# Patient Record
Sex: FEMALE | Race: BLACK OR AFRICAN AMERICAN | Employment: FULL TIME | URBAN - METROPOLITAN AREA
[De-identification: names, ages, dates, MRNs, and addresses within clinical notes are randomized per-mention and may not be internally consistent; named-entity substitution may affect disease eponyms.]

---

## 2018-10-11 ENCOUNTER — APPOINTMENT (OUTPATIENT)
Dept: GENERAL RADIOLOGY | Age: 43
End: 2018-10-11
Attending: EMERGENCY MEDICINE
Payer: SELF-PAY

## 2018-10-11 ENCOUNTER — HOSPITAL ENCOUNTER (EMERGENCY)
Age: 43
Discharge: HOME | End: 2018-10-12
Attending: EMERGENCY MEDICINE
Payer: SELF-PAY

## 2018-10-11 DIAGNOSIS — R07.89 ATYPICAL CHEST PAIN: Primary | ICD-10-CM

## 2018-10-11 LAB
BASOPHILS # BLD: 0 K/UL (ref 0–0.1)
BASOPHILS NFR BLD: 0 % (ref 0–2)
D DIMER PPP FEU-MCNC: 0.4 UG/ML(FEU)
DIFFERENTIAL METHOD BLD: NORMAL
EOSINOPHIL # BLD: 0.2 K/UL (ref 0–0.4)
EOSINOPHIL NFR BLD: 2 % (ref 0–5)
ERYTHROCYTE [DISTWIDTH] IN BLOOD BY AUTOMATED COUNT: 12.7 % (ref 11.6–14.5)
HCT VFR BLD AUTO: 41.8 % (ref 35–45)
HGB BLD-MCNC: 13.9 G/DL (ref 12–16)
LYMPHOCYTES # BLD: 3.5 K/UL (ref 0.9–3.6)
LYMPHOCYTES NFR BLD: 33 % (ref 21–52)
MCH RBC QN AUTO: 30.2 PG (ref 24–34)
MCHC RBC AUTO-ENTMCNC: 33.3 G/DL (ref 31–37)
MCV RBC AUTO: 90.7 FL (ref 74–97)
MONOCYTES # BLD: 0.5 K/UL (ref 0.05–1.2)
MONOCYTES NFR BLD: 5 % (ref 3–10)
NEUTS SEG # BLD: 6.5 K/UL (ref 1.8–8)
NEUTS SEG NFR BLD: 60 % (ref 40–73)
PLATELET # BLD AUTO: 255 K/UL (ref 135–420)
PMV BLD AUTO: 10.5 FL (ref 9.2–11.8)
RBC # BLD AUTO: 4.61 M/UL (ref 4.2–5.3)
WBC # BLD AUTO: 10.7 K/UL (ref 4.6–13.2)

## 2018-10-11 PROCEDURE — 93005 ELECTROCARDIOGRAM TRACING: CPT

## 2018-10-11 PROCEDURE — 85025 COMPLETE CBC W/AUTO DIFF WBC: CPT | Performed by: EMERGENCY MEDICINE

## 2018-10-11 PROCEDURE — 74011250637 HC RX REV CODE- 250/637: Performed by: EMERGENCY MEDICINE

## 2018-10-11 PROCEDURE — 99284 EMERGENCY DEPT VISIT MOD MDM: CPT

## 2018-10-11 PROCEDURE — 82553 CREATINE MB FRACTION: CPT | Performed by: EMERGENCY MEDICINE

## 2018-10-11 PROCEDURE — 85379 FIBRIN DEGRADATION QUANT: CPT | Performed by: EMERGENCY MEDICINE

## 2018-10-11 PROCEDURE — 71045 X-RAY EXAM CHEST 1 VIEW: CPT

## 2018-10-11 PROCEDURE — 80048 BASIC METABOLIC PNL TOTAL CA: CPT | Performed by: EMERGENCY MEDICINE

## 2018-10-11 RX ORDER — GUAIFENESIN 100 MG/5ML
324 LIQUID (ML) ORAL
Status: DISCONTINUED | OUTPATIENT
Start: 2018-10-11 | End: 2018-10-11 | Stop reason: CLARIF

## 2018-10-11 RX ORDER — NITROGLYCERIN 0.4 MG/1
0.4 TABLET SUBLINGUAL
Status: COMPLETED | OUTPATIENT
Start: 2018-10-11 | End: 2018-10-11

## 2018-10-11 RX ADMIN — NITROGLYCERIN 0.4 MG: 0.4 TABLET SUBLINGUAL at 23:20

## 2018-10-12 VITALS
TEMPERATURE: 98.5 F | HEART RATE: 64 BPM | RESPIRATION RATE: 18 BRPM | WEIGHT: 171 LBS | OXYGEN SATURATION: 98 % | SYSTOLIC BLOOD PRESSURE: 161 MMHG | DIASTOLIC BLOOD PRESSURE: 89 MMHG

## 2018-10-12 LAB
ANION GAP SERPL CALC-SCNC: 11 MMOL/L (ref 3–18)
ATRIAL RATE: 81 BPM
BUN SERPL-MCNC: 14 MG/DL (ref 7–18)
BUN/CREAT SERPL: 17 (ref 12–20)
CALCIUM SERPL-MCNC: 9.1 MG/DL (ref 8.5–10.1)
CALCULATED P AXIS, ECG09: 58 DEGREES
CALCULATED R AXIS, ECG10: 12 DEGREES
CALCULATED T AXIS, ECG11: 30 DEGREES
CHLORIDE SERPL-SCNC: 103 MMOL/L (ref 100–108)
CK MB CFR SERPL CALC: NORMAL % (ref 0–4)
CK MB SERPL-MCNC: <1 NG/ML (ref 5–25)
CK SERPL-CCNC: 101 U/L (ref 26–192)
CO2 SERPL-SCNC: 28 MMOL/L (ref 21–32)
CREAT SERPL-MCNC: 0.84 MG/DL (ref 0.6–1.3)
DIAGNOSIS, 93000: NORMAL
GLUCOSE SERPL-MCNC: 96 MG/DL (ref 74–99)
P-R INTERVAL, ECG05: 154 MS
POTASSIUM SERPL-SCNC: 3.4 MMOL/L (ref 3.5–5.5)
Q-T INTERVAL, ECG07: 368 MS
QRS DURATION, ECG06: 82 MS
QTC CALCULATION (BEZET), ECG08: 427 MS
SODIUM SERPL-SCNC: 142 MMOL/L (ref 136–145)
TROPONIN I SERPL-MCNC: <0.02 NG/ML (ref 0–0.06)
TROPONIN I SERPL-MCNC: <0.02 NG/ML (ref 0–0.06)
VENTRICULAR RATE, ECG03: 81 BPM

## 2018-10-12 PROCEDURE — 84484 ASSAY OF TROPONIN QUANT: CPT | Performed by: EMERGENCY MEDICINE

## 2018-10-12 RX ORDER — HYDROCHLOROTHIAZIDE 25 MG/1
25 TABLET ORAL DAILY
Qty: 30 TAB | Refills: 0 | Status: SHIPPED | OUTPATIENT
Start: 2018-10-12 | End: 2018-10-22

## 2018-10-12 RX ORDER — IBUPROFEN 600 MG/1
600 TABLET ORAL
Qty: 30 TAB | Refills: 0 | Status: SHIPPED | OUTPATIENT
Start: 2018-10-12 | End: 2019-09-13

## 2018-10-12 NOTE — ED PROVIDER NOTES
EMERGENCY DEPARTMENT HISTORY AND PHYSICAL EXAM 
 
10:56 PM 
 
 
Date: 10/11/2018 Patient Name: Viridiana Greenberg History of Presenting Illness Chief Complaint Patient presents with  Chest Pain History Provided By: Patient Chief Complaint: Chest Pain Duration: 1 Weeks Timing:  Waxing and Waning Location: left side Quality: Donny Duncan Severity: 7 out of 10 Modifying Factors: Nothing makes it better or worse Associated Symptoms: HTN Additional History (Context): Viridiana Greenberg is a 41 y.o. female with a PMHx of HTN who presents with waxing and waning, sharp left sided CP described as a 7/10 at bedside onset x 1 week ago with associated HTN. Nothing makes the CP better or worse. PTA pt took 325 mg Justina aspirin. Pt is currently taking lisinopril, but has not been taking it as prescribed this past week due to running out. Pt denies being under a lot of stress, SOB, fever, and chills. No further concerns or complaints at this time. PCP: UNKNOWN 
 
 
 
Past History Past Medical History: 
Past Medical History:  
Diagnosis Date  Hypertension Past Surgical History: 
History reviewed. No pertinent surgical history. Family History: 
History reviewed. No pertinent family history. Social History: 
Social History Substance Use Topics  Smoking status: Never Smoker  Smokeless tobacco: Never Used  Alcohol use No  
 
 
Allergies: 
No Known Allergies Review of Systems Review of Systems Constitutional: Negative for chills, fatigue and fever. HENT: Negative for sore throat. Eyes: Negative. Respiratory: Negative for cough and shortness of breath. Cardiovascular: Positive for chest pain. Negative for palpitations. Positive for HTN. Gastrointestinal: Negative for abdominal pain, nausea and vomiting. Genitourinary: Negative for dysuria. Musculoskeletal: Negative. Skin: Negative. Neurological: Negative for dizziness, weakness, light-headedness and headaches. Psychiatric/Behavioral: Negative. All other systems reviewed and are negative. Physical Exam  
 
Visit Vitals  BP (!) 165/95  Pulse 71  Temp 98.5 °F (36.9 °C)  Resp 18  Wt 77.6 kg (171 lb)  SpO2 98% Physical Exam  
Constitutional: She is oriented to person, place, and time. She appears well-developed and well-nourished. No distress. HENT:  
Head: Normocephalic and atraumatic. Right Ear: External ear normal.  
Left Ear: External ear normal.  
Nose: Nose normal.  
Mouth/Throat: Oropharynx is clear and moist.  
Eyes: Conjunctivae and EOM are normal. Pupils are equal, round, and reactive to light. No scleral icterus. Neck: Normal range of motion. Neck supple. No JVD present. No tracheal deviation present. No thyromegaly present. Cardiovascular: Normal rate, regular rhythm, normal heart sounds and intact distal pulses. Exam reveals no gallop and no friction rub. No murmur heard. Pulmonary/Chest: Effort normal and breath sounds normal. She exhibits no tenderness. Abdominal: Soft. Bowel sounds are normal. She exhibits no distension. There is no tenderness. There is no rebound and no guarding. Musculoskeletal: Normal range of motion. She exhibits no edema or tenderness. Lymphadenopathy:  
  She has no cervical adenopathy. Neurological: She is alert and oriented to person, place, and time. No cranial nerve deficit. Coordination normal.  
No sensory loss, Gait normal, Motor 5/5 Skin: Skin is warm and dry. Psychiatric: She has a normal mood and affect. Her behavior is normal. Judgment and thought content normal.  
Nursing note and vitals reviewed. Diagnostic Study Results Labs - Recent Results (from the past 12 hour(s)) EKG, 12 LEAD, INITIAL Collection Time: 10/11/18 11:01 PM  
Result Value Ref Range  Ventricular Rate 81 BPM  
 Atrial Rate 81 BPM  
 P-R Interval 154 ms QRS Duration 82 ms Q-T Interval 368 ms QTC Calculation (Bezet) 427 ms Calculated P Axis 58 degrees Calculated R Axis 12 degrees Calculated T Axis 30 degrees Diagnosis Normal sinus rhythm Normal ECG No previous ECGs available CBC WITH AUTOMATED DIFF Collection Time: 10/11/18 11:05 PM  
Result Value Ref Range WBC 10.7 4.6 - 13.2 K/uL  
 RBC 4.61 4.20 - 5.30 M/uL  
 HGB 13.9 12.0 - 16.0 g/dL HCT 41.8 35.0 - 45.0 % MCV 90.7 74.0 - 97.0 FL  
 MCH 30.2 24.0 - 34.0 PG  
 MCHC 33.3 31.0 - 37.0 g/dL  
 RDW 12.7 11.6 - 14.5 % PLATELET 360 423 - 401 K/uL MPV 10.5 9.2 - 11.8 FL  
 NEUTROPHILS 60 40 - 73 % LYMPHOCYTES 33 21 - 52 % MONOCYTES 5 3 - 10 % EOSINOPHILS 2 0 - 5 % BASOPHILS 0 0 - 2 %  
 ABS. NEUTROPHILS 6.5 1.8 - 8.0 K/UL  
 ABS. LYMPHOCYTES 3.5 0.9 - 3.6 K/UL  
 ABS. MONOCYTES 0.5 0.05 - 1.2 K/UL  
 ABS. EOSINOPHILS 0.2 0.0 - 0.4 K/UL  
 ABS. BASOPHILS 0.0 0.0 - 0.1 K/UL  
 DF AUTOMATED METABOLIC PANEL, BASIC Collection Time: 10/11/18 11:05 PM  
Result Value Ref Range Sodium 142 136 - 145 mmol/L Potassium 3.4 (L) 3.5 - 5.5 mmol/L Chloride 103 100 - 108 mmol/L  
 CO2 28 21 - 32 mmol/L Anion gap 11 3.0 - 18 mmol/L Glucose 96 74 - 99 mg/dL BUN 14 7.0 - 18 MG/DL Creatinine 0.84 0.6 - 1.3 MG/DL  
 BUN/Creatinine ratio 17 12 - 20 GFR est AA >60 >60 ml/min/1.73m2 GFR est non-AA >60 >60 ml/min/1.73m2 Calcium 9.1 8.5 - 10.1 MG/DL  
CARDIAC PANEL,(CK, CKMB & TROPONIN) Collection Time: 10/11/18 11:05 PM  
Result Value Ref Range  26 - 192 U/L  
 CK - MB <1.0 <3.6 ng/ml CK-MB Index  0.0 - 4.0 % CALCULATION NOT PERFORMED WHEN RESULT IS BELOW LINEAR LIMIT Troponin-I, Qt. <0.02 0.00 - 0.06 NG/ML  
D DIMER Collection Time: 10/11/18 11:05 PM  
Result Value Ref Range D DIMER 0.40 <0.46 ug/ml(FEU) Radiologic Studies -  
XR CHEST PORT    (Results Pending) Medical Decision Making I am the first provider for this patient. I reviewed the vital signs, available nursing notes, past medical history, past surgical history, family history and social history. Vital Signs-Reviewed the patient's vital signs. Pulse Oximetry Analysis - 100% on room air, normal. 
 
EKG: Interpreted by the EP. Time Interpreted:  
 Rate:  
 Rhythm:  
 Interpretation: 
 Comparison:  
 
Records Reviewed: Nursing Notes (Time of Review: 10:56 PM) 
 
ED Course: Progress Notes, Reevaluation, and Consults: 
Patient remained asymptomatic without chest pain through out her ER evaluation. Patient EKG was normal and 2 sets of cardiac enzymes were normal. 
 
Provider Notes (Medical Decision Making): angina, MI, pleuritis, pneumonia, pneumothorax, PE and etc. 
 
 
Diagnosis Clinical Impression: chest pain Disposition: D/C home, F/U cardiologist in 2 days. Return to ER prn. Follow-up Information Follow up With Details Comments Contact Info [id number] [facility] EMERGENCY DEPT  As needed [address] [id number] [phone] [facility] Call 2-3 days for follow-up or with your own PCP [address] [place] [id number] [phone] Patient's Medications No medications on file  
 
_______________________________ Attestations: 
Scribe Attestation Gavin Strickland acting as a scribe for and in the presence of Jasmina Hall MD     
October 11, 2018 at 10:56 PM 
    
Provider Attestation:     
I personally performed the services described in the documentation, reviewed the documentation, as recorded by the scribe in my presence, and it accurately and completely records my words and actions. October 11, 2018 at 10:56 PM - Jasmina Hall MD   
_______________________________

## 2018-10-12 NOTE — ED NOTES
I have reviewed discharge instructions with the patient and significant other. The patient and significant other verbalized understanding. Current Discharge Medication List  
  
START taking these medications Details  
ibuprofen (MOTRIN) 600 mg tablet Take 1 Tab by mouth every six (6) hours as needed for Pain. Qty: 30 Tab, Refills: 0  
  
hydroCHLOROthiazide (HYDRODIURIL) 25 mg tablet Take 1 Tab by mouth daily for 10 days. Qty: 30 Tab, Refills: 0

## 2018-10-12 NOTE — DISCHARGE INSTRUCTIONS
Chest Pain: Care Instructions  Your Care Instructions    There are many things that can cause chest pain. Some are not serious and will get better on their own in a few days. But some kinds of chest pain need more testing and treatment. Your doctor may have recommended a follow-up visit in the next 8 to 12 hours. If you are not getting better, you may need more tests or treatment. Even though your doctor has released you, you still need to watch for any problems. The doctor carefully checked you, but sometimes problems can develop later. If you have new symptoms or if your symptoms do not get better, get medical care right away. If you have worse or different chest pain or pressure that lasts more than 5 minutes or you passed out (lost consciousness), call 911 or seek other emergency help right away. A medical visit is only one step in your treatment. Even if you feel better, you still need to do what your doctor recommends, such as going to all suggested follow-up appointments and taking medicines exactly as directed. This will help you recover and help prevent future problems. How can you care for yourself at home? · Rest until you feel better. · Take your medicine exactly as prescribed. Call your doctor if you think you are having a problem with your medicine. · Do not drive after taking a prescription pain medicine. When should you call for help? Call 911 if:    · You passed out (lost consciousness).     · You have severe difficulty breathing.     · You have symptoms of a heart attack. These may include:  ¨ Chest pain or pressure, or a strange feeling in your chest.  ¨ Sweating. ¨ Shortness of breath. ¨ Nausea or vomiting. ¨ Pain, pressure, or a strange feeling in your back, neck, jaw, or upper belly or in one or both shoulders or arms. ¨ Lightheadedness or sudden weakness. ¨ A fast or irregular heartbeat.   After you call 911, the  may tell you to chew 1 adult-strength or 2 to 4 low-dose aspirin. Wait for an ambulance. Do not try to drive yourself.    Call your doctor today if:    · You have any trouble breathing.     · Your chest pain gets worse.     · You are dizzy or lightheaded, or you feel like you may faint.     · You are not getting better as expected.     · You are having new or different chest pain. Where can you learn more? Go to http://araceli-maude.info/. Enter A120 in the search box to learn more about \"Chest Pain: Care Instructions. \"  Current as of: November 20, 2017  Content Version: 11.8  © 0089-1025 [employer]. Care instructions adapted under license by [employer] (which disclaims liability or warranty for this information). If you have questions about a medical condition or this instruction, always ask your healthcare professional. [address] any warranty or liability for your use of this information. Chest Pain: Care Instructions  Your Care Instructions    There are many things that can cause chest pain. Some are not serious and will get better on their own in a few days. But some kinds of chest pain need more testing and treatment. Your doctor may have recommended a follow-up visit in the next 8 to 12 hours. If you are not getting better, you may need more tests or treatment. Even though your doctor has released you, you still need to watch for any problems. The doctor carefully checked you, but sometimes problems can develop later. If you have new symptoms or if your symptoms do not get better, get medical care right away. If you have worse or different chest pain or pressure that lasts more than 5 minutes or you passed out (lost consciousness), call 911 or seek other emergency help right away. A medical visit is only one step in your treatment.  Even if you feel better, you still need to do what your doctor recommends, such as going to all suggested follow-up appointments and taking medicines exactly as directed. This will help you recover and help prevent future problems. How can you care for yourself at home? · Rest until you feel better. · Take your medicine exactly as prescribed. Call your doctor if you think you are having a problem with your medicine. · Do not drive after taking a prescription pain medicine. When should you call for help? Call 911 if:    · You passed out (lost consciousness).     · You have severe difficulty breathing.     · You have symptoms of a heart attack. These may include:  ¨ Chest pain or pressure, or a strange feeling in your chest.  ¨ Sweating. ¨ Shortness of breath. ¨ Nausea or vomiting. ¨ Pain, pressure, or a strange feeling in your back, neck, jaw, or upper belly or in one or both shoulders or arms. ¨ Lightheadedness or sudden weakness. ¨ A fast or irregular heartbeat. After you call 911, the  may tell you to chew 1 adult-strength or 2 to 4 low-dose aspirin. Wait for an ambulance. Do not try to drive yourself.    Call your doctor today if:    · You have any trouble breathing.     · Your chest pain gets worse.     · You are dizzy or lightheaded, or you feel like you may faint.     · You are not getting better as expected.     · You are having new or different chest pain. Where can you learn more? Go to http://araceli-maude.info/. Enter A120 in the search box to learn more about \"Chest Pain: Care Instructions. \"  Current as of: November 20, 2017  Content Version: 11.8  © 2821-3754 [employer]. Care instructions adapted under license by [employer] (which disclaims liability or warranty for this information). If you have questions about a medical condition or this instruction, always ask your healthcare professional. [address] any warranty or liability for your use of this information.        Chest Pain: Care Instructions  Your Care Instructions    There are many things that can cause chest pain. Some are not serious and will get better on their own in a few days. But some kinds of chest pain need more testing and treatment. Your doctor may have recommended a follow-up visit in the next 8 to 12 hours. If you are not getting better, you may need more tests or treatment. Even though your doctor has released you, you still need to watch for any problems. The doctor carefully checked you, but sometimes problems can develop later. If you have new symptoms or if your symptoms do not get better, get medical care right away. If you have worse or different chest pain or pressure that lasts more than 5 minutes or you passed out (lost consciousness), call 911 or seek other emergency help right away. A medical visit is only one step in your treatment. Even if you feel better, you still need to do what your doctor recommends, such as going to all suggested follow-up appointments and taking medicines exactly as directed. This will help you recover and help prevent future problems. How can you care for yourself at home? · Rest until you feel better. · Take your medicine exactly as prescribed. Call your doctor if you think you are having a problem with your medicine. · Do not drive after taking a prescription pain medicine. When should you call for help? Call 911 if:    · You passed out (lost consciousness).     · You have severe difficulty breathing.     · You have symptoms of a heart attack. These may include:  ¨ Chest pain or pressure, or a strange feeling in your chest.  ¨ Sweating. ¨ Shortness of breath. ¨ Nausea or vomiting. ¨ Pain, pressure, or a strange feeling in your back, neck, jaw, or upper belly or in one or both shoulders or arms. ¨ Lightheadedness or sudden weakness. ¨ A fast or irregular heartbeat. After you call 911, the  may tell you to chew 1 adult-strength or 2 to 4 low-dose aspirin. Wait for an ambulance.  Do not try to drive yourself.    Call your doctor today if:    · You have any trouble breathing.     · Your chest pain gets worse.     · You are dizzy or lightheaded, or you feel like you may faint.     · You are not getting better as expected.     · You are having new or different chest pain. Where can you learn more? Go to http://araceli-maude.info/. Enter A120 in the search box to learn more about \"Chest Pain: Care Instructions. \"  Current as of: November 20, 2017  Content Version: 11.8  © 6019-2736 [employer]. Care instructions adapted under license by [employer] (which disclaims liability or warranty for this information). If you have questions about a medical condition or this instruction, always ask your healthcare professional. [address] any warranty or liability for your use of this information.

## 2019-09-11 ENCOUNTER — HOSPITAL ENCOUNTER (INPATIENT)
Age: 44
LOS: 2 days | Discharge: HOME HEALTH | DRG: 282 | End: 2019-09-13
Attending: EMERGENCY MEDICINE | Admitting: EMERGENCY MEDICINE
Payer: COMMERCIAL

## 2019-09-11 ENCOUNTER — APPOINTMENT (OUTPATIENT)
Dept: GENERAL RADIOLOGY | Age: 44
DRG: 282 | End: 2019-09-11
Attending: EMERGENCY MEDICINE
Payer: COMMERCIAL

## 2019-09-11 ENCOUNTER — APPOINTMENT (OUTPATIENT)
Dept: NON INVASIVE DIAGNOSTICS | Age: 44
DRG: 282 | End: 2019-09-11
Attending: EMERGENCY MEDICINE
Payer: COMMERCIAL

## 2019-09-11 ENCOUNTER — APPOINTMENT (OUTPATIENT)
Dept: CT IMAGING | Age: 44
DRG: 282 | End: 2019-09-11
Attending: EMERGENCY MEDICINE
Payer: COMMERCIAL

## 2019-09-11 DIAGNOSIS — I24.9 ACS (ACUTE CORONARY SYNDROME) (HCC): ICD-10-CM

## 2019-09-11 DIAGNOSIS — I10 MALIGNANT HYPERTENSION: Primary | ICD-10-CM

## 2019-09-11 DIAGNOSIS — I25.119 CORONARY ARTERY DISEASE WITH ANGINA PECTORIS, UNSPECIFIED VESSEL OR LESION TYPE, UNSPECIFIED WHETHER NATIVE OR TRANSPLANTED HEART (HCC): ICD-10-CM

## 2019-09-11 LAB
ANION GAP SERPL CALC-SCNC: 8 MMOL/L (ref 3–18)
APTT PPP: 28.5 SEC (ref 23–36.4)
BASOPHILS # BLD: 0 K/UL (ref 0–0.1)
BASOPHILS NFR BLD: 0 % (ref 0–2)
BUN SERPL-MCNC: 14 MG/DL (ref 7–18)
BUN/CREAT SERPL: 19 (ref 12–20)
CALCIUM SERPL-MCNC: 8.8 MG/DL (ref 8.5–10.1)
CHLORIDE SERPL-SCNC: 107 MMOL/L (ref 100–111)
CHOLEST SERPL-MCNC: 216 MG/DL
CK MB CFR SERPL CALC: 10.2 % (ref 0–4)
CK MB CFR SERPL CALC: 11.5 % (ref 0–4)
CK MB CFR SERPL CALC: 5.2 % (ref 0–4)
CK MB CFR SERPL CALC: 8.2 % (ref 0–4)
CK MB SERPL-MCNC: 30.7 NG/ML (ref 5–25)
CK MB SERPL-MCNC: 49.5 NG/ML (ref 5–25)
CK MB SERPL-MCNC: 66.3 NG/ML (ref 5–25)
CK MB SERPL-MCNC: 9.5 NG/ML (ref 5–25)
CK SERPL-CCNC: 183 U/L (ref 26–192)
CK SERPL-CCNC: 374 U/L (ref 26–192)
CK SERPL-CCNC: 487 U/L (ref 26–192)
CK SERPL-CCNC: 579 U/L (ref 26–192)
CO2 SERPL-SCNC: 28 MMOL/L (ref 21–32)
CREAT SERPL-MCNC: 0.75 MG/DL (ref 0.6–1.3)
DIFFERENTIAL METHOD BLD: NORMAL
EOSINOPHIL # BLD: 0.2 K/UL (ref 0–0.4)
EOSINOPHIL NFR BLD: 2 % (ref 0–5)
ERYTHROCYTE [DISTWIDTH] IN BLOOD BY AUTOMATED COUNT: 13 % (ref 11.6–14.5)
GLUCOSE SERPL-MCNC: 113 MG/DL (ref 74–99)
HBA1C MFR BLD: 5.6 % (ref 4.2–5.6)
HCG UR QL: NEGATIVE
HCT VFR BLD AUTO: 41.7 % (ref 35–45)
HDLC SERPL-MCNC: 63 MG/DL (ref 40–60)
HDLC SERPL: 3.4 {RATIO} (ref 0–5)
HGB BLD-MCNC: 14 G/DL (ref 12–16)
INR PPP: 0.9 (ref 0.8–1.2)
LDLC SERPL CALC-MCNC: 137.8 MG/DL (ref 0–100)
LIPID PROFILE,FLP: ABNORMAL
LYMPHOCYTES # BLD: 2.3 K/UL (ref 0.9–3.6)
LYMPHOCYTES NFR BLD: 26 % (ref 21–52)
MAGNESIUM SERPL-MCNC: 2 MG/DL (ref 1.6–2.6)
MCH RBC QN AUTO: 29.9 PG (ref 24–34)
MCHC RBC AUTO-ENTMCNC: 33.6 G/DL (ref 31–37)
MCV RBC AUTO: 89.1 FL (ref 74–97)
MONOCYTES # BLD: 0.5 K/UL (ref 0.05–1.2)
MONOCYTES NFR BLD: 6 % (ref 3–10)
NEUTS SEG # BLD: 5.8 K/UL (ref 1.8–8)
NEUTS SEG NFR BLD: 66 % (ref 40–73)
PLATELET # BLD AUTO: 257 K/UL (ref 135–420)
PMV BLD AUTO: 10.2 FL (ref 9.2–11.8)
POTASSIUM SERPL-SCNC: 3.3 MMOL/L (ref 3.5–5.5)
PROTHROMBIN TIME: 12 SEC (ref 11.5–15.2)
RBC # BLD AUTO: 4.68 M/UL (ref 4.2–5.3)
SODIUM SERPL-SCNC: 143 MMOL/L (ref 136–145)
TRIGL SERPL-MCNC: 76 MG/DL (ref ?–150)
TROPONIN I SERPL-MCNC: 12.8 NG/ML (ref 0–0.04)
TROPONIN I SERPL-MCNC: 14.4 NG/ML (ref 0–0.04)
TROPONIN I SERPL-MCNC: 2.74 NG/ML (ref 0–0.04)
TROPONIN I SERPL-MCNC: 7.93 NG/ML (ref 0–0.04)
VLDLC SERPL CALC-MCNC: 15.2 MG/DL
WBC # BLD AUTO: 8.9 K/UL (ref 4.6–13.2)

## 2019-09-11 PROCEDURE — 74011250636 HC RX REV CODE- 250/636: Performed by: EMERGENCY MEDICINE

## 2019-09-11 PROCEDURE — 74011250636 HC RX REV CODE- 250/636: Performed by: INTERNAL MEDICINE

## 2019-09-11 PROCEDURE — 74011250637 HC RX REV CODE- 250/637: Performed by: INTERNAL MEDICINE

## 2019-09-11 PROCEDURE — B2111ZZ FLUOROSCOPY OF MULTIPLE CORONARY ARTERIES USING LOW OSMOLAR CONTRAST: ICD-10-PCS | Performed by: INTERNAL MEDICINE

## 2019-09-11 PROCEDURE — 74011636320 HC RX REV CODE- 636/320: Performed by: INTERNAL MEDICINE

## 2019-09-11 PROCEDURE — 77030013797 HC KT TRNSDUC PRSSR EDWD -A: Performed by: INTERNAL MEDICINE

## 2019-09-11 PROCEDURE — 94761 N-INVAS EAR/PLS OXIMETRY MLT: CPT

## 2019-09-11 PROCEDURE — 77030015766: Performed by: INTERNAL MEDICINE

## 2019-09-11 PROCEDURE — 70450 CT HEAD/BRAIN W/O DYE: CPT

## 2019-09-11 PROCEDURE — B2151ZZ FLUOROSCOPY OF LEFT HEART USING LOW OSMOLAR CONTRAST: ICD-10-PCS | Performed by: INTERNAL MEDICINE

## 2019-09-11 PROCEDURE — 80061 LIPID PANEL: CPT

## 2019-09-11 PROCEDURE — 65660000000 HC RM CCU STEPDOWN

## 2019-09-11 PROCEDURE — 71045 X-RAY EXAM CHEST 1 VIEW: CPT

## 2019-09-11 PROCEDURE — 77030027845 HC BND COM RDL D-STAT TELE -B: Performed by: INTERNAL MEDICINE

## 2019-09-11 PROCEDURE — 74011000250 HC RX REV CODE- 250: Performed by: INTERNAL MEDICINE

## 2019-09-11 PROCEDURE — 81025 URINE PREGNANCY TEST: CPT

## 2019-09-11 PROCEDURE — 74011250637 HC RX REV CODE- 250/637: Performed by: EMERGENCY MEDICINE

## 2019-09-11 PROCEDURE — 83036 HEMOGLOBIN GLYCOSYLATED A1C: CPT

## 2019-09-11 PROCEDURE — 82550 ASSAY OF CK (CPK): CPT

## 2019-09-11 PROCEDURE — 93005 ELECTROCARDIOGRAM TRACING: CPT

## 2019-09-11 PROCEDURE — 74011000250 HC RX REV CODE- 250: Performed by: EMERGENCY MEDICINE

## 2019-09-11 PROCEDURE — 74011250636 HC RX REV CODE- 250/636

## 2019-09-11 PROCEDURE — 96374 THER/PROPH/DIAG INJ IV PUSH: CPT

## 2019-09-11 PROCEDURE — C1894 INTRO/SHEATH, NON-LASER: HCPCS | Performed by: INTERNAL MEDICINE

## 2019-09-11 PROCEDURE — 85730 THROMBOPLASTIN TIME PARTIAL: CPT

## 2019-09-11 PROCEDURE — 93458 L HRT ARTERY/VENTRICLE ANGIO: CPT | Performed by: INTERNAL MEDICINE

## 2019-09-11 PROCEDURE — 83735 ASSAY OF MAGNESIUM: CPT

## 2019-09-11 PROCEDURE — 4A023N7 MEASUREMENT OF CARDIAC SAMPLING AND PRESSURE, LEFT HEART, PERCUTANEOUS APPROACH: ICD-10-PCS | Performed by: INTERNAL MEDICINE

## 2019-09-11 PROCEDURE — 99152 MOD SED SAME PHYS/QHP 5/>YRS: CPT | Performed by: INTERNAL MEDICINE

## 2019-09-11 PROCEDURE — 80048 BASIC METABOLIC PNL TOTAL CA: CPT

## 2019-09-11 PROCEDURE — 85025 COMPLETE CBC W/AUTO DIFF WBC: CPT

## 2019-09-11 PROCEDURE — 85610 PROTHROMBIN TIME: CPT

## 2019-09-11 PROCEDURE — 77030013744: Performed by: INTERNAL MEDICINE

## 2019-09-11 PROCEDURE — 99285 EMERGENCY DEPT VISIT HI MDM: CPT

## 2019-09-11 PROCEDURE — 36415 COLL VENOUS BLD VENIPUNCTURE: CPT

## 2019-09-11 RX ORDER — ONDANSETRON 2 MG/ML
4 INJECTION INTRAMUSCULAR; INTRAVENOUS
Status: DISCONTINUED | OUTPATIENT
Start: 2019-09-11 | End: 2019-09-13 | Stop reason: HOSPADM

## 2019-09-11 RX ORDER — MIDAZOLAM HYDROCHLORIDE 1 MG/ML
INJECTION, SOLUTION INTRAMUSCULAR; INTRAVENOUS AS NEEDED
Status: DISCONTINUED | OUTPATIENT
Start: 2019-09-11 | End: 2019-09-11 | Stop reason: HOSPADM

## 2019-09-11 RX ORDER — DOCUSATE SODIUM 100 MG/1
100 CAPSULE, LIQUID FILLED ORAL 2 TIMES DAILY
Status: DISCONTINUED | OUTPATIENT
Start: 2019-09-11 | End: 2019-09-13 | Stop reason: HOSPADM

## 2019-09-11 RX ORDER — NITROGLYCERIN 0.4 MG/1
0.4 TABLET SUBLINGUAL AS NEEDED
Status: DISCONTINUED | OUTPATIENT
Start: 2019-09-11 | End: 2019-09-13 | Stop reason: HOSPADM

## 2019-09-11 RX ORDER — HYDRALAZINE HYDROCHLORIDE 25 MG/1
25 TABLET, FILM COATED ORAL
Status: DISCONTINUED | OUTPATIENT
Start: 2019-09-11 | End: 2019-09-11

## 2019-09-11 RX ORDER — POTASSIUM CHLORIDE 20 MEQ/1
40 TABLET, EXTENDED RELEASE ORAL DAILY
Status: COMPLETED | OUTPATIENT
Start: 2019-09-12 | End: 2019-09-13

## 2019-09-11 RX ORDER — VERAPAMIL HYDROCHLORIDE 2.5 MG/ML
INJECTION, SOLUTION INTRAVENOUS AS NEEDED
Status: DISCONTINUED | OUTPATIENT
Start: 2019-09-11 | End: 2019-09-11 | Stop reason: HOSPADM

## 2019-09-11 RX ORDER — HYDRALAZINE HYDROCHLORIDE 20 MG/ML
10 INJECTION INTRAMUSCULAR; INTRAVENOUS
Status: DISCONTINUED | OUTPATIENT
Start: 2019-09-11 | End: 2019-09-13 | Stop reason: HOSPADM

## 2019-09-11 RX ORDER — MORPHINE SULFATE 2 MG/ML
2 INJECTION, SOLUTION INTRAMUSCULAR; INTRAVENOUS
Status: DISCONTINUED | OUTPATIENT
Start: 2019-09-11 | End: 2019-09-13 | Stop reason: HOSPADM

## 2019-09-11 RX ORDER — ACETAMINOPHEN 325 MG/1
650 TABLET ORAL
Status: DISCONTINUED | OUTPATIENT
Start: 2019-09-11 | End: 2019-09-13 | Stop reason: HOSPADM

## 2019-09-11 RX ORDER — GUAIFENESIN 100 MG/5ML
81 LIQUID (ML) ORAL DAILY
Status: DISCONTINUED | OUTPATIENT
Start: 2019-09-11 | End: 2019-09-13 | Stop reason: HOSPADM

## 2019-09-11 RX ORDER — METOPROLOL TARTRATE 5 MG/5ML
5 INJECTION INTRAVENOUS
Status: COMPLETED | OUTPATIENT
Start: 2019-09-11 | End: 2019-09-11

## 2019-09-11 RX ORDER — ENOXAPARIN SODIUM 100 MG/ML
80 INJECTION SUBCUTANEOUS
Status: DISCONTINUED | OUTPATIENT
Start: 2019-09-11 | End: 2019-09-11

## 2019-09-11 RX ORDER — CLONIDINE HYDROCHLORIDE 0.1 MG/1
0.1 TABLET ORAL
Status: COMPLETED | OUTPATIENT
Start: 2019-09-11 | End: 2019-09-11

## 2019-09-11 RX ORDER — SODIUM CHLORIDE 0.9 % (FLUSH) 0.9 %
5-40 SYRINGE (ML) INJECTION AS NEEDED
Status: DISCONTINUED | OUTPATIENT
Start: 2019-09-11 | End: 2019-09-13 | Stop reason: HOSPADM

## 2019-09-11 RX ORDER — CARVEDILOL 6.25 MG/1
6.25 TABLET ORAL 2 TIMES DAILY WITH MEALS
Status: DISCONTINUED | OUTPATIENT
Start: 2019-09-11 | End: 2019-09-11

## 2019-09-11 RX ORDER — DIGOXIN 0.25 MG/ML
INJECTION INTRAMUSCULAR; INTRAVENOUS AS NEEDED
Status: DISCONTINUED | OUTPATIENT
Start: 2019-09-11 | End: 2019-09-11 | Stop reason: HOSPADM

## 2019-09-11 RX ORDER — SODIUM CHLORIDE 0.9 % (FLUSH) 0.9 %
5-40 SYRINGE (ML) INJECTION EVERY 8 HOURS
Status: DISCONTINUED | OUTPATIENT
Start: 2019-09-11 | End: 2019-09-13 | Stop reason: HOSPADM

## 2019-09-11 RX ORDER — ONDANSETRON 2 MG/ML
4 INJECTION INTRAMUSCULAR; INTRAVENOUS ONCE
Status: COMPLETED | OUTPATIENT
Start: 2019-09-11 | End: 2019-09-11

## 2019-09-11 RX ORDER — MAGNESIUM SULFATE HEPTAHYDRATE 40 MG/ML
2 INJECTION, SOLUTION INTRAVENOUS ONCE
Status: COMPLETED | OUTPATIENT
Start: 2019-09-11 | End: 2019-09-11

## 2019-09-11 RX ORDER — LIDOCAINE HYDROCHLORIDE 10 MG/ML
INJECTION, SOLUTION EPIDURAL; INFILTRATION; INTRACAUDAL; PERINEURAL AS NEEDED
Status: DISCONTINUED | OUTPATIENT
Start: 2019-09-11 | End: 2019-09-11 | Stop reason: HOSPADM

## 2019-09-11 RX ORDER — DIGOXIN 0.25 MG/ML
INJECTION INTRAMUSCULAR; INTRAVENOUS
Status: DISCONTINUED
Start: 2019-09-11 | End: 2019-09-11 | Stop reason: WASHOUT

## 2019-09-11 RX ORDER — HEPARIN SODIUM 1000 [USP'U]/ML
INJECTION, SOLUTION INTRAVENOUS; SUBCUTANEOUS AS NEEDED
Status: DISCONTINUED | OUTPATIENT
Start: 2019-09-11 | End: 2019-09-11 | Stop reason: HOSPADM

## 2019-09-11 RX ORDER — ATORVASTATIN CALCIUM 40 MG/1
40 TABLET, FILM COATED ORAL
Status: DISCONTINUED | OUTPATIENT
Start: 2019-09-11 | End: 2019-09-13 | Stop reason: HOSPADM

## 2019-09-11 RX ORDER — CARVEDILOL 12.5 MG/1
12.5 TABLET ORAL 2 TIMES DAILY WITH MEALS
Status: DISCONTINUED | OUTPATIENT
Start: 2019-09-11 | End: 2019-09-13 | Stop reason: HOSPADM

## 2019-09-11 RX ORDER — FENTANYL CITRATE 50 UG/ML
INJECTION, SOLUTION INTRAMUSCULAR; INTRAVENOUS AS NEEDED
Status: DISCONTINUED | OUTPATIENT
Start: 2019-09-11 | End: 2019-09-11 | Stop reason: HOSPADM

## 2019-09-11 RX ORDER — AMLODIPINE BESYLATE 2.5 MG/1
2.5 TABLET ORAL DAILY
Status: DISCONTINUED | OUTPATIENT
Start: 2019-09-11 | End: 2019-09-13

## 2019-09-11 RX ORDER — METOPROLOL TARTRATE 5 MG/5ML
INJECTION INTRAVENOUS AS NEEDED
Status: DISCONTINUED | OUTPATIENT
Start: 2019-09-11 | End: 2019-09-11 | Stop reason: HOSPADM

## 2019-09-11 RX ORDER — GUAIFENESIN 100 MG/5ML
162 LIQUID (ML) ORAL
Status: COMPLETED | OUTPATIENT
Start: 2019-09-11 | End: 2019-09-11

## 2019-09-11 RX ORDER — ENOXAPARIN SODIUM 100 MG/ML
1 INJECTION SUBCUTANEOUS
Status: COMPLETED | OUTPATIENT
Start: 2019-09-11 | End: 2019-09-11

## 2019-09-11 RX ORDER — ENOXAPARIN SODIUM 100 MG/ML
1 INJECTION SUBCUTANEOUS EVERY 12 HOURS
Status: DISCONTINUED | OUTPATIENT
Start: 2019-09-11 | End: 2019-09-13 | Stop reason: HOSPADM

## 2019-09-11 RX ADMIN — ENOXAPARIN SODIUM 80 MG: 80 INJECTION SUBCUTANEOUS at 05:03

## 2019-09-11 RX ADMIN — FAMOTIDINE 20 MG: 10 INJECTION INTRAVENOUS at 21:41

## 2019-09-11 RX ADMIN — METOPROLOL TARTRATE 5 MG: 5 INJECTION, SOLUTION INTRAVENOUS at 03:25

## 2019-09-11 RX ADMIN — ASPIRIN 81 MG 81 MG: 81 TABLET ORAL at 09:08

## 2019-09-11 RX ADMIN — ASPIRIN 81 MG 162 MG: 81 TABLET ORAL at 03:21

## 2019-09-11 RX ADMIN — Medication 10 ML: at 14:00

## 2019-09-11 RX ADMIN — DOCUSATE SODIUM 100 MG: 100 CAPSULE, LIQUID FILLED ORAL at 17:28

## 2019-09-11 RX ADMIN — DOCUSATE SODIUM 100 MG: 100 CAPSULE, LIQUID FILLED ORAL at 09:08

## 2019-09-11 RX ADMIN — FAMOTIDINE 20 MG: 10 INJECTION INTRAVENOUS at 09:44

## 2019-09-11 RX ADMIN — ONDANSETRON 4 MG: 2 INJECTION INTRAMUSCULAR; INTRAVENOUS at 09:44

## 2019-09-11 RX ADMIN — CLONIDINE HYDROCHLORIDE 0.1 MG: 0.1 TABLET ORAL at 02:37

## 2019-09-11 RX ADMIN — ONDANSETRON 4 MG: 2 INJECTION INTRAMUSCULAR; INTRAVENOUS at 16:06

## 2019-09-11 RX ADMIN — ACETAMINOPHEN 650 MG: 325 TABLET ORAL at 09:08

## 2019-09-11 RX ADMIN — HYDRALAZINE HYDROCHLORIDE 10 MG: 20 INJECTION INTRAMUSCULAR; INTRAVENOUS at 12:35

## 2019-09-11 RX ADMIN — ATORVASTATIN CALCIUM 40 MG: 40 TABLET, FILM COATED ORAL at 21:40

## 2019-09-11 RX ADMIN — ONDANSETRON 4 MG: 2 INJECTION INTRAMUSCULAR; INTRAVENOUS at 12:35

## 2019-09-11 RX ADMIN — ENOXAPARIN SODIUM 80 MG: 80 INJECTION SUBCUTANEOUS at 18:05

## 2019-09-11 RX ADMIN — NITROGLYCERIN 1 INCH: 20 OINTMENT TOPICAL at 03:22

## 2019-09-11 RX ADMIN — MORPHINE SULFATE 2 MG: 2 INJECTION, SOLUTION INTRAMUSCULAR; INTRAVENOUS at 16:06

## 2019-09-11 RX ADMIN — AMLODIPINE BESYLATE 2.5 MG: 2.5 TABLET ORAL at 09:08

## 2019-09-11 RX ADMIN — CARVEDILOL 12.5 MG: 12.5 TABLET, FILM COATED ORAL at 17:28

## 2019-09-11 RX ADMIN — MORPHINE SULFATE 2 MG: 2 INJECTION, SOLUTION INTRAMUSCULAR; INTRAVENOUS at 12:36

## 2019-09-11 RX ADMIN — Medication 10 ML: at 21:31

## 2019-09-11 RX ADMIN — MAGNESIUM SULFATE 2 G: 2 INJECTION INTRAVENOUS at 14:44

## 2019-09-11 NOTE — ED NOTES
Pt denies any pain at this time, resp relaxed. nsr on monitor noted. Color pink resp relaxed with nasal cannula in place. Pt taken to her car via wheelchair to put personal items in.  Pt has phone and  with her plus her car keys

## 2019-09-11 NOTE — PROGRESS NOTES
[facility] report noted. D/w Dr Cristal Elliott and he recommends to continue treatment dose Lovenox. Will order.

## 2019-09-11 NOTE — ED NOTES
Pt medicated per order. Pt denies any pain at this time. Hx chest pain during the day. resp relaxed.

## 2019-09-11 NOTE — PROGRESS NOTES
Right wrist D-STAT band removed. No bleeding or swelling. Sterile hemostatic dressing applied. Safety splint applied. Safety instructions reviewed. Normal radial pulse, normal distal circulation and neuro check.

## 2019-09-11 NOTE — CONSULTS
[facility] - Consult Note    Consultation request by Dr. Franny Almaraz for advice/opinion related to evaluating NSTEMI    Date of  Admission: 9/11/2019  1:53 AM   Primary Care Physician:  UNKNOWN     Assessment:     -NSTEMI, had episode of chest pain x 2-3 seconds PTA associated with left 4th/5th finger numbness/tingling and diaphoresis, which lasted hours. Troponin 2.74 --> 14.40. Has been nauseous and had V x 4 since being given NTG in the ER.    -HTN, uncontrolled, /120 initially, has been running ~220/140-160 at home. She was previously on HCTZ but not on any medication x 1 year.  -Hyperlipidemia, Chol 216, HDL 63, .8 on 9/11/2019  -S/p hysterectomy approx. 2 years ago    No PCP or cardiologist.     Plan:     Currently seen and evaluated. Agree with below. Patient with non-STEMI, and grandmother who apparently passed away at age 43 with myocardial infarction. She has multiple coronary risk factors. We will proceed with coronary angiography. All questions answered. She is in agreement.    -Cardiac catheterization was discussed with patient and  to include details of procedure, risks, benefits and alternatives. Answered all questions. They are in agreement with procedure this afternoon. Pt states she has not had anything to eat today, advised pt and  to keep NPO for procedure. Cath lab team has been notified.  -Echocardiogram pending. -ASA, Lipitor started by primary team.  -Will add Coreg as well. -Further recommendations to follow based on test results, hospital course. -Importance of medication compliance was emphasized with patient and . History of Present Illness: This is a 45 y.o. female admitted for Malignant hypertension [I10]  Acute coronary syndrome ([facility] [address] [I24.9]  ACS (acute coronary syndrome) ([facility] [address] [I24.9].     Patient complains of:  Chest pain, left hand numbness    Saji Frias is a 45 y.o. female with PMHx as described above, who presented to the hospital due to episode of chest pain that lasted 2-3 seconds associated with numbness/tingling in left 4th/5th fingers and diaphoresis that lasted hours. She reports she developed nausea and has vomited four times since being given NTG. Pt states she has been having intermittent stabbing chest pains from inside out that last for a second over the past several months, along with other chest pains that last a few seconds. States chest pain has been getting more frequent, occurring two times over the past week. She reports chronic chest tightness and shortness of breath that worsen with exertion. She notes that she developed orthopnea approximately 3 weeks ago to where she now uses 5 pillows to help her breathing. Cardiac risk factors: smoking/ tobacco exposure, dyslipidemia, hypertension      Review of Symptoms:  Except as stated above include:  Constitutional:  As per HPI  Respiratory:  negative  Cardiovascular:  As per HPI  Gastrointestinal: As per HPI  Genitourinary:  negative  Musculoskeletal:  Negative  Neurological:  Negative  Dermatological:  Negative  Endocrinological: Negative  Psychological:  Negative       Past Medical History:     Past Medical History:   Diagnosis Date    HLD (hyperlipidemia)     Hypertension          Social History:     Social History     Socioeconomic History    Marital status: SINGLE     Spouse name: Not on file    Number of children: Not on file    Years of education: Not on file    Highest education level: Not on file   Tobacco Use    Smoking status: Never Smoker    Smokeless tobacco: Never Used   Substance and Sexual Activity    Alcohol use: No    Drug use: No        Family History:   History reviewed. No pertinent family history.      Medications:   No Known Allergies     Current Facility-Administered Medications   Medication Dose Route Frequency    aspirin chewable tablet 81 mg  81 mg Oral DAILY    hydrALAZINE (APRESOLINE) tablet 25 mg  25 mg Oral Q6H PRN    amLODIPine (NORVASC) tablet 2.5 mg  2.5 mg Oral DAILY    acetaminophen (TYLENOL) tablet 650 mg  650 mg Oral Q6H PRN    docusate sodium (COLACE) capsule 100 mg  100 mg Oral BID    famotidine (PF) (PEPCID) 20 mg in sodium chloride 0.9% 10 mL injection  20 mg IntraVENous Q12H    atorvastatin (LIPITOR) tablet 40 mg  40 mg Oral QHS    nitroglycerin (NITROSTAT) tablet 0.4 mg  0.4 mg SubLINGual PRN    ondansetron (ZOFRAN) injection 4 mg  4 mg IntraVENous Q6H PRN         Physical Exam:     Visit Vitals  BP (!) 164/97 (BP 1 Location: Right arm, BP Patient Position: At rest)   Pulse 65   Temp 98.4 °F (36.9 °C)   Resp 17   Ht 5' (1.524 m)   Wt 169 lb (76.7 kg)   SpO2 97%   BMI 33.01 kg/m²     BP Readings from Last 3 Encounters:   09/11/19 (!) 164/97   10/12/18 161/89     Pulse Readings from Last 3 Encounters:   09/11/19 65   10/12/18 64     Wt Readings from Last 3 Encounters:   09/11/19 169 lb (76.7 kg)   10/11/18 171 lb (77.6 kg)       General:  alert, cooperative, no distress, appears stated age  Neck:  supple  Lungs:  clear to auscultation bilaterally  Heart:  Regular rate and rhythm  Abdomen:  abdomen is soft without significant tenderness, masses, organomegaly or guarding  Extremities:  Atraumatic, no edema  Skin: Warm and dry.    Neuro: alert, oriented x3, affect appropriate, no focal neurological deficits, moves all extremities well, no involuntary movements  Psych: non focal     Data Review:     Recent Labs     09/11/19  0236   WBC 8.9   HGB 14.0   HCT 41.7        Recent Labs     09/11/19  0236      K 3.3*      CO2 28   *   BUN 14   CREA 0.75   CA 8.8   INR 0.9       Results for orders placed or performed during the hospital encounter of 09/11/19   EKG, 12 LEAD, INITIAL   Result Value Ref Range    Ventricular Rate 70 BPM    Atrial Rate 70 BPM    P-R Interval 162 ms    QRS Duration 92 ms    Q-T Interval 392 ms    QTC Calculation (Bezet) 423 ms    Calculated P Axis 54 degrees    Calculated R Axis 0 degrees    Calculated T Axis 26 degrees    Diagnosis       Normal sinus rhythm  Incomplete right bundle branch block  Minimal voltage criteria for LVH, may be normal variant  Nonspecific T wave abnormality  Abnormal ECG  When compared with ECG of 11-OCT-2018 23:01,  Nonspecific T wave abnormality now evident in Lateral leads         All Cardiac Markers in the last 24 hours:    Lab Results   Component Value Date/Time     (H) 09/11/2019 10:21 AM     09/11/2019 02:36 AM    CKMB 66.3 (H) 09/11/2019 10:21 AM    CKMB 9.5 (H) 09/11/2019 02:36 AM    CKND1 11.5 (H) 09/11/2019 10:21 AM    CKND1 5.2 (H) 09/11/2019 02:36 AM    TROIQ 14.40 () 09/11/2019 10:21 AM    TROIQ 2.74 ([facility]) 09/11/2019 02:36 AM       Last Lipid:    Lab Results   Component Value Date/Time    Cholesterol, total 216 (H) 09/11/2019 10:21 AM    HDL Cholesterol 63 (H) 09/11/2019 10:21 AM    LDL, calculated 137.8 (H) 09/11/2019 10:21 AM    Triglyceride 76 09/11/2019 10:21 AM    CHOL/HDL Ratio 3.4 09/11/2019 10:21 AM       Signed By: Mikhail Tucker PA-C     September 11, 2019

## 2019-09-11 NOTE — PROGRESS NOTES
Reason for Admission:  Malignant hypertension [I10]  Acute coronary syndrome (HCC) [I24.9]  ACS (acute coronary syndrome) ([facility] [address] [I24.9]                 RRAT Score:    0            Plan for utilizing home health:    no                      Likelihood of Readmission:   LOW                         Transition of Care Plan:              Initial assessment completed with patient. Cognitive status of patient: oriented to time, place, person and situation. Face sheet information confirmed:  no. The patient designates Karyle Laws to participate in her discharge plan and to receive any needed information. This patient lives in a single family home with patient and boyfriend. Patient is able to navigate steps as needed. Prior to hospitalization, patient was considered to be independent with ADLs/IADLS : yes . Patient has a current ACP document on file: no  The patient and boyfriend will be available to transport patient home upon discharge. The patient already has none reported,  medical equipment available in the home. Patient is not currently active with home health. Patient has not stayed in a skilled nursing facility or rehab. This patient is on dialysis :no      Freedom of choice signed: no. Currently, the discharge plan is Home. The patient states that she can obtain her medications from the pharmacy, and take her medications as directed.     Patient's current insurance is [employer]       Care Management Interventions  PCP Verified by CM: Yes(Needs PCP)  Mode of Transport at Discharge: Self  Current Support Network: Lives with Spouse(Fiance)  Confirm Follow Up Transport: Family  Plan discussed with Pt/Family/Caregiver: Yes  Discharge Location  Discharge Placement: Jorge Woodall, RN  Care Manager  [phone]

## 2019-09-11 NOTE — Clinical Note
TRANSFER - IN REPORT:  
 
Verbal report received from: [address]. Report consisted of patient's Situation, Background, Assessment and  
Recommendations(SBAR). Opportunity for questions and clarification was provided. Assessment completed upon patient's arrival to unit and care assumed. Patient transported with a Cardiac Cath Tech / Patient Care Tech.

## 2019-09-11 NOTE — ROUTINE PROCESS
Bedside shift change report given to [facility] (oncoming nurse) by Micheal Manley RN (offgoing nurse). Report included the following information SBAR, Kardex, Intake/Output, MAR and Recent Results.

## 2019-09-11 NOTE — PROGRESS NOTES
Received report from 2S RN. Written report given to Julia Romero. Pt to be direct loaded into cath lab.

## 2019-09-11 NOTE — Clinical Note
PT RECENT HX OF MALIGNANT HTN  
PT RECEIVED HYDRALAZINE, MORPHINE,ZOPHRAN, NTG 
IV LFT WRIST 
RECENT EPISODES OF NAUSEA AND VOMITING

## 2019-09-11 NOTE — ED NOTES
Pt has been out of blood pressure medicine r/t no insuance. Pt just started with new insurance September 1st. Education on getting primary MD done with pt. Pt c/o headache, and elevated bp at home. Pt medicated per order.

## 2019-09-11 NOTE — ED PROVIDER NOTES
Pt c/o freq chest pain and ha. Sx's of both just pta. Took motrin, pain resolved no curr pain. Lasts 20-30 min when present. Says also freq left arm numbness last few days. Says numb just pta. No numbness currently. No weakness. No dizziness. H/o htn, says lost pcp 8 months ago, no meds since then. No current pcp. Was on hctz. No urinary sx's. Not pregnant. No leg pain or swelling. No vision changes. No current complaints. Says diastolic blood pressure was 140 pta. Past Medical History:   Diagnosis Date    HLD (hyperlipidemia)     Hypertension        Past Surgical History:   Procedure Laterality Date    HX HYSTERECTOMY           History reviewed. No pertinent family history.     Social History     Socioeconomic History    Marital status: SINGLE     Spouse name: Not on file    Number of children: Not on file    Years of education: Not on file    Highest education level: Not on file   Occupational History    Not on file   Social Needs    Financial resource strain: Not on file    Food insecurity:     Worry: Not on file     Inability: Not on file    Transportation needs:     Medical: Not on file     Non-medical: Not on file   Tobacco Use    Smoking status: Never Smoker    Smokeless tobacco: Never Used   Substance and Sexual Activity    Alcohol use: No    Drug use: No    Sexual activity: Not on file   Lifestyle    Physical activity:     Days per week: Not on file     Minutes per session: Not on file    Stress: Not on file   Relationships    Social connections:     Talks on phone: Not on file     Gets together: Not on file     Attends Confucianist service: Not on file     Active member of club or organization: Not on file     Attends meetings of clubs or organizations: Not on file     Relationship status: Not on file    Intimate partner violence:     Fear of current or ex partner: Not on file     Emotionally abused: Not on file     Physically abused: Not on file     Forced sexual activity: Not on file   Other Topics Concern    Not on file   Social History Narrative    Not on file         ALLERGIES: Patient has no known allergies. Review of Systems   Constitutional: Negative for fever. HENT: Negative for congestion. Respiratory: Negative for cough and shortness of breath. Cardiovascular: Positive for chest pain. Gastrointestinal: Negative for abdominal pain, blood in stool and vomiting. Musculoskeletal: Negative for back pain. Skin: Negative for rash. Neurological: Positive for numbness and headaches. Negative for light-headedness. All other systems reviewed and are negative. Vitals:    09/11/19 0310 09/11/19 0328 09/11/19 0353 09/11/19 0354   BP: (!) 192/113 (!) 175/95 (!) 159/103    Pulse:  67  68   Resp:  17  16   Temp:       SpO2: 100% 100%  100%   Weight:       Height:                Physical Exam   Constitutional: She is oriented to person, place, and time. She appears well-developed. HENT:   Head: Normocephalic and atraumatic. Eyes: Pupils are equal, round, and reactive to light. Neck: Normal range of motion. Cardiovascular: Normal rate and regular rhythm. No murmur heard. Pulmonary/Chest: Effort normal. She has no wheezes. Abdominal: Soft. There is no tenderness. Musculoskeletal: She exhibits no tenderness. Neurological: She is alert and oriented to person, place, and time. Skin: Skin is dry. Capillary refill takes less than 2 seconds. No rash noted. She is not diaphoretic. Psychiatric: She has a normal mood and affect. Nursing note and vitals reviewed.        MDM       Procedures    Vitals:  Patient Vitals for the past 12 hrs:   Temp Pulse Resp BP SpO2   09/11/19 0354  68 16  100 %   09/11/19 0353    (!) 159/103    09/11/19 0328  67 17 (!) 175/95 100 %   09/11/19 0310    (!) 192/113 100 %   09/11/19 0240    (!) 181/112 98 %   09/11/19 0149 98.8 °F (37.1 °C) 72 18 (!) 210/120 99 %         Medications ordered:   Medications enoxaparin (LOVENOX) injection 80 mg (has no administration in time range)   cloNIDine HCl (CATAPRES) tablet 0.1 mg (0.1 mg Oral Given 9/11/19 0237)   nitroglycerin (NITROBID) 2 % ointment 1 Inch (1 Inch Topical Given 9/11/19 0322)   aspirin chewable tablet 162 mg (162 mg Oral Given 9/11/19 0321)   metoprolol (LOPRESSOR) injection 5 mg (5 mg IntraVENous Given 9/11/19 0325)         Lab findings:  Recent Results (from the past 12 hour(s))   EKG, 12 LEAD, INITIAL    Collection Time: 09/11/19  2:31 AM   Result Value Ref Range    Ventricular Rate 70 BPM    Atrial Rate 70 BPM    P-R Interval 162 ms    QRS Duration 92 ms    Q-T Interval 392 ms    QTC Calculation (Bezet) 423 ms    Calculated P Axis 54 degrees    Calculated R Axis 0 degrees    Calculated T Axis 26 degrees    Diagnosis       Normal sinus rhythm  Incomplete right bundle branch block  Minimal voltage criteria for LVH, may be normal variant  Nonspecific T wave abnormality  Abnormal ECG  When compared with ECG of 11-OCT-2018 23:01,  Nonspecific T wave abnormality now evident in Lateral leads     CBC WITH AUTOMATED DIFF    Collection Time: 09/11/19  2:36 AM   Result Value Ref Range    WBC 8.9 4.6 - 13.2 K/uL    RBC 4.68 4.20 - 5.30 M/uL    HGB 14.0 12.0 - 16.0 g/dL    HCT 41.7 35.0 - 45.0 %    MCV 89.1 74.0 - 97.0 FL    MCH 29.9 24.0 - 34.0 PG    MCHC 33.6 31.0 - 37.0 g/dL    RDW 13.0 11.6 - 14.5 %    PLATELET 795 454 - 653 K/uL    MPV 10.2 9.2 - 11.8 FL    NEUTROPHILS 66 40 - 73 %    LYMPHOCYTES 26 21 - 52 %    MONOCYTES 6 3 - 10 %    EOSINOPHILS 2 0 - 5 %    BASOPHILS 0 0 - 2 %    ABS. NEUTROPHILS 5.8 1.8 - 8.0 K/UL    ABS. LYMPHOCYTES 2.3 0.9 - 3.6 K/UL    ABS. MONOCYTES 0.5 0.05 - 1.2 K/UL    ABS. EOSINOPHILS 0.2 0.0 - 0.4 K/UL    ABS.  BASOPHILS 0.0 0.0 - 0.1 K/UL    DF AUTOMATED     METABOLIC PANEL, BASIC    Collection Time: 09/11/19  2:36 AM   Result Value Ref Range    Sodium 143 136 - 145 mmol/L    Potassium 3.3 (L) 3.5 - 5.5 mmol/L    Chloride 107 100 - 111 mmol/L    CO2 28 21 - 32 mmol/L    Anion gap 8 3.0 - 18 mmol/L    Glucose 113 (H) 74 - 99 mg/dL    BUN 14 7.0 - 18 MG/DL    Creatinine 0.75 0.6 - 1.3 MG/DL    BUN/Creatinine ratio 19 12 - 20      GFR est AA >60 >60 ml/min/1.73m2    GFR est non-AA >60 >60 ml/min/1.73m2    Calcium 8.8 8.5 - 10.1 MG/DL   CARDIAC PANEL,(CK, CKMB & TROPONIN)    Collection Time: 09/11/19  2:36 AM   Result Value Ref Range     26 - 192 U/L    CK - MB 9.5 (H) <3.6 ng/ml    CK-MB Index 5.2 (H) 0.0 - 4.0 %    Troponin-I, QT 2.74 (HH) 0.0 - 0.045 NG/ML   PROTHROMBIN TIME + INR    Collection Time: 09/11/19  2:36 AM   Result Value Ref Range    Prothrombin time 12.0 11.5 - 15.2 sec    INR 0.9 0.8 - 1.2     PTT    Collection Time: 09/11/19  2:36 AM   Result Value Ref Range    aPTT 28.5 23.0 - 36.4 SEC           X-Ray, CT or other radiology findings or impressions:  CT HEAD WO CONT   Final Result   IMPRESSION:       Unremarkable CT appearance of the brain. Empty sella. XR CHEST PORT    (Results Pending)       Progress notes, Consult notes or additional Procedure notes:   3:29 AM no curr complaints. Nvi. No cp. bp improving  4:03 AM bp 156/89 feels much better, no complaints. 4:11 AM d/w dr Giselle Soares, to admit      Diagnosis:   1. Malignant hypertension    2. ACS (acute coronary syndrome) ([facility])        Disposition: home    Follow-up Information    None          Patient's Medications   Start Taking    No medications on file   Continue Taking    IBUPROFEN (MOTRIN) 600 MG TABLET    Take 1 Tab by mouth every six (6) hours as needed for Pain.    These Medications have changed    No medications on file   Stop Taking    No medications on file

## 2019-09-11 NOTE — Clinical Note
Right groin and right radial clipped, prepped with ChloraPrep and draped. Wet prep solution applied at: 116. Wet prep solution dried at: 119. Wet prep elapsed drying time: 3 mins.

## 2019-09-11 NOTE — PROGRESS NOTES
Ms. Gaines Self requested morphine for a headache. She last received morphine, IV @ 1236. I declined to give her morphine and offered her Tylenol, she declined the Tylenol.

## 2019-09-11 NOTE — Clinical Note
Contrast Dose Calculator:  
Patient's age: 45.  
Patient's sex: Female. Patient weight (kg) = 76.7. Creatinine level (mg/dL) = 0.75. Creatinine clearance (mL/min): 118. Contrast concentration (mg/mL) = 300. MACD = 300 mL. Max Contrast dose per Creatinine Cl calculator = 265.5 mL.

## 2019-09-11 NOTE — H&P
[facility]  HISTORY AND PHYSICAL    Name:  Mariano Trejo  MR#:   [id number]  :  1975  ACCOUNT #:  [de-identified]  ADMIT DATE:  2019      CHIEF COMPLAINT:  Chest pain and headache. HISTORY OF PRESENT ILLNESS:  This is a 57-year-old female presented to [facility] Emergency Room with complaints of chest pain and headache. The patient was evaluated at [facility] and then admission was requested, [facility] When I saw the patient, she currently denies any chest pain. The patient states that the chest pain she had was left sided, episodic, and lasted for around 20 to 30 minutes. The patient has also noted some episodes of left arm numbness. Currently, the patient denies any numbness. The patient denies any neck pain. No history of any fever or chills. The patient describes headache. The patient states her headache got worse after she received some nitroglycerin paste at [facility] ED. The patient says that she also developed some nausea and vomiting since she received the nitroglycerin paste. No history of any cough. No history of any shortness of breath. No history of any palpitations. No history of abdominal pain, urinary complaints, diarrhea, or leg swelling. No history of any rash. No history of any rectal bleeding. The patient states that she has a known history of high blood pressure but did not have insurance and has not been taking any blood pressure medication for more than a year. PAST MEDICAL HISTORY:  Hypertension and dyslipidemia. PAST SURGICAL HISTORY:  The patient has had a hysterectomy in the past.    ALLERGIES:  NO KNOWN DRUG ALLERGIES. MEDICATIONS PRIOR TO ADMISSION:  None as per the patient, the patient denies taking any herbal products at home. SOCIAL HISTORY:  The patient denies any tobacco use, any alcohol use. The patient denies any illicit drug use.   The patient works as a program implementer. FAMILY HISTORY:  Mother  from heart attack at 43years of age. Father  from alcoholism. PHYSICAL EXAMINATION:  GENERAL:  This is a 65-year-old female sitting in bed in no apparent distress. VITAL SIGNS:  For the patient show a temperature of 98.4, pulse rate of 65, blood pressure now 164/97, respiratory rate of 17, oxygen saturation of 97%. Initial blood pressure was 210/120. HEENT:  Head:  Normocephalic, atraumatic. Eyes:  Pupils are reactive to light bilaterally. Ears, Nose, and Throat:  No ear or nasal discharge is seen. Mucous membranes are moist.  NECK:  Supple. No JVD. No carotid bruit. No thyromegaly. No lymphadenopathy. LUNGS:  Clear to auscultation bilaterally. HEART:  S1 and S2 are heard. Regular rate and rhythm. ABDOMEN:  Soft. Bowel sounds positive. Nontender. Nondistended. EXTREMITIES:  No lower extremity edema is noted. Pulses are 1+, bilaterally equal.  NEUROLOGIC:  The patient is awake, alert, and oriented x3. The patient follows commands and responds appropriately. No focal neurological deficit is identified. LABORATORY DATA:  Labs today show a WBC count of 8.9, hemoglobin of 14.0, hematocrit 41.7, platelet count 976, INR of 0.9. Sodium 143, potassium 3.3, chloride 107, CO2 28, glucose of 113, BUN 14, creatinine 0.75. Troponin first set is 2.73. CT scan of the head was done in the ED which was reported as unremarkable CT appearance of the brain and it mentioned empty sella. EKG as per my reading showed sinus rhythm at the rate of 70 beats per minute, nonspecific ST-T changes are noted. EKG was read by myself. IMPRESSION:  1. Hypertensive urgency. 2.  Elevated troponin with concern for non-ST elevation myocardial infarction. 3.  History of noncompliance. 4.  History of dyslipidemia. 5.  Empty sella noted on CT scan. 6.  Prior history of hysterectomy. PLAN:  The patient has been admitted to telemetry.   Cardiac biomarkers will be trended. The patient will be continued on aspirin, and the patient did receive treatment dose of Lovenox around 5 a.m. this morning while at [facility] ED. The patient also received a dose of beta-blocker. Blood pressure has shown improvement with the medications that the patient received at [facility]. At this time, we will start the patient on amlodipine. We will also start the patient on statin. We will order p.r.n. hydralazine. Cardiology has been consulted. Echocardiogram has been ordered. Given that the patient had worsening headache, nausea, and vomiting after the nitroglycerin paste, I have asked the nursing staff to remove that, and we will continue to monitor blood pressures. Rest depending on the patient's further hospital course. Plan of care was discussed with the patient, and she verbalized understanding and agreed. The patient states that she recently moved to this area from [place] but plans to stay here. I also discussed the level of care with the patient and she wishes to be a full code.       Demetris Oliveira MD      [id number]  D:  09/11/2019 11:53  T:  09/11/2019 14:19  JOB #:  [id number]

## 2019-09-11 NOTE — ROUTINE PROCESS
MD notified of patient arrival. Currently awaiting orders. Patient in room resting quietly, vitals completed.

## 2019-09-11 NOTE — ED NOTES
TRANSFER - OUT REPORT:    Verbal report given to [employer] (name) on Saji Borne  being transferred to 217(unit) for routine progression of care       Report consisted of patients Situation, Background, Assessment and   Recommendations(SBAR). Information from the following report(s) SBAR, ED Summary, MAR, Recent Results and Cardiac Rhythm nsr was reviewed with the receiving nurse. Opportunity for questions and clarification was provided.       Patient transported with:   Monitor  O2 @ 2 liters

## 2019-09-11 NOTE — Clinical Note
TRANSFER - OUT REPORT:  
 
Verbal report given to: [address] Report consisted of patient's Situation, Background, Assessment and  
Recommendations(SBAR). Opportunity for questions and clarification was provided. Patient transported with a Cardiac Cath Tech / Patient Care Tech. Patient transported to: [address]

## 2019-09-11 NOTE — ED TRIAGE NOTES
Pt c/o elevated blood pressure & headache for several days. Pt states she has been out of bp medication for approx. 8 months.

## 2019-09-12 ENCOUNTER — APPOINTMENT (OUTPATIENT)
Dept: NON INVASIVE DIAGNOSTICS | Age: 44
DRG: 282 | End: 2019-09-12
Attending: EMERGENCY MEDICINE
Payer: COMMERCIAL

## 2019-09-12 LAB
ANION GAP SERPL CALC-SCNC: 8 MMOL/L (ref 3–18)
ATRIAL RATE: 105 BPM
ATRIAL RATE: 66 BPM
ATRIAL RATE: 70 BPM
BASOPHILS # BLD: 0 K/UL (ref 0–0.1)
BASOPHILS NFR BLD: 0 % (ref 0–2)
BUN SERPL-MCNC: 12 MG/DL (ref 7–18)
BUN/CREAT SERPL: 15 (ref 12–20)
CALCIUM SERPL-MCNC: 8.1 MG/DL (ref 8.5–10.1)
CALCULATED P AXIS, ECG09: 54 DEGREES
CALCULATED P AXIS, ECG09: 57 DEGREES
CALCULATED R AXIS, ECG10: 0 DEGREES
CALCULATED R AXIS, ECG10: 28 DEGREES
CALCULATED T AXIS, ECG11: -116 DEGREES
CALCULATED T AXIS, ECG11: -60 DEGREES
CALCULATED T AXIS, ECG11: 26 DEGREES
CHLORIDE SERPL-SCNC: 102 MMOL/L (ref 100–111)
CO2 SERPL-SCNC: 27 MMOL/L (ref 21–32)
CREAT SERPL-MCNC: 0.79 MG/DL (ref 0.6–1.3)
DIAGNOSIS, 93000: NORMAL
DIFFERENTIAL METHOD BLD: NORMAL
ECHO AO ROOT DIAM: 2.53 CM
ECHO LA AREA 4C: 19.7 CM2
ECHO LA VOL 2C: 60.31 ML (ref 22–52)
ECHO LA VOL 4C: 58.17 ML (ref 22–52)
ECHO LA VOL BP: 62.64 ML (ref 22–52)
ECHO LA VOL/BSA BIPLANE: 36.05 ML/M2 (ref 16–28)
ECHO LA VOLUME INDEX A2C: 34.71 ML/M2 (ref 16–28)
ECHO LA VOLUME INDEX A4C: 33.48 ML/M2 (ref 16–28)
ECHO LV INTERNAL DIMENSION DIASTOLIC: 4.2 CM (ref 3.9–5.3)
ECHO LV INTERNAL DIMENSION SYSTOLIC: 2.46 CM
ECHO LV IVSD: 1.36 CM (ref 0.6–0.9)
ECHO LV MASS 2D: 227.1 G (ref 67–162)
ECHO LV MASS INDEX 2D: 130.7 G/M2 (ref 43–95)
ECHO LV POSTERIOR WALL DIASTOLIC: 1.17 CM (ref 0.6–0.9)
ECHO LVOT DIAM: 1.76 CM
ECHO LVOT PEAK GRADIENT: 2.8 MMHG
ECHO LVOT PEAK VELOCITY: 82.97 CM/S
ECHO LVOT VTI: 18.06 CM
ECHO MV A VELOCITY: 78.58 CM/S
ECHO MV E DECELERATION TIME (DT): 194.1 MS
ECHO MV E VELOCITY: 65.75 CM/S
ECHO MV E/A RATIO: 0.84
ECHO TV REGURGITANT MAX VELOCITY: 233.45 CM/S
ECHO TV REGURGITANT PEAK GRADIENT: 21.8 MMHG
EOSINOPHIL # BLD: 0.1 K/UL (ref 0–0.4)
EOSINOPHIL NFR BLD: 1 % (ref 0–5)
ERYTHROCYTE [DISTWIDTH] IN BLOOD BY AUTOMATED COUNT: 13.1 % (ref 11.6–14.5)
GLUCOSE SERPL-MCNC: 84 MG/DL (ref 74–99)
HCT VFR BLD AUTO: 38.7 % (ref 35–45)
HGB BLD-MCNC: 13.2 G/DL (ref 12–16)
LYMPHOCYTES # BLD: 2.4 K/UL (ref 0.9–3.6)
LYMPHOCYTES NFR BLD: 23 % (ref 21–52)
MAGNESIUM SERPL-MCNC: 2.5 MG/DL (ref 1.6–2.6)
MCH RBC QN AUTO: 29.6 PG (ref 24–34)
MCHC RBC AUTO-ENTMCNC: 34.1 G/DL (ref 31–37)
MCV RBC AUTO: 86.8 FL (ref 74–97)
MONOCYTES # BLD: 0.9 K/UL (ref 0.05–1.2)
MONOCYTES NFR BLD: 8 % (ref 3–10)
NEUTS SEG # BLD: 7.2 K/UL (ref 1.8–8)
NEUTS SEG NFR BLD: 68 % (ref 40–73)
P-R INTERVAL, ECG05: 162 MS
P-R INTERVAL, ECG05: 170 MS
PLATELET # BLD AUTO: 267 K/UL (ref 135–420)
PMV BLD AUTO: 10.4 FL (ref 9.2–11.8)
POTASSIUM SERPL-SCNC: 2.9 MMOL/L (ref 3.5–5.5)
Q-T INTERVAL, ECG07: 320 MS
Q-T INTERVAL, ECG07: 392 MS
Q-T INTERVAL, ECG07: 460 MS
QRS DURATION, ECG06: 80 MS
QRS DURATION, ECG06: 84 MS
QRS DURATION, ECG06: 92 MS
QTC CALCULATION (BEZET), ECG08: 423 MS
QTC CALCULATION (BEZET), ECG08: 481 MS
QTC CALCULATION (BEZET), ECG08: 482 MS
RBC # BLD AUTO: 4.46 M/UL (ref 4.2–5.3)
SODIUM SERPL-SCNC: 137 MMOL/L (ref 136–145)
VENTRICULAR RATE, ECG03: 136 BPM
VENTRICULAR RATE, ECG03: 66 BPM
VENTRICULAR RATE, ECG03: 70 BPM
WBC # BLD AUTO: 10.6 K/UL (ref 4.6–13.2)

## 2019-09-12 PROCEDURE — 74011000250 HC RX REV CODE- 250: Performed by: EMERGENCY MEDICINE

## 2019-09-12 PROCEDURE — 93005 ELECTROCARDIOGRAM TRACING: CPT

## 2019-09-12 PROCEDURE — 93306 TTE W/DOPPLER COMPLETE: CPT

## 2019-09-12 PROCEDURE — 83735 ASSAY OF MAGNESIUM: CPT

## 2019-09-12 PROCEDURE — 74011000250 HC RX REV CODE- 250: Performed by: HOSPITALIST

## 2019-09-12 PROCEDURE — 74011250636 HC RX REV CODE- 250/636: Performed by: EMERGENCY MEDICINE

## 2019-09-12 PROCEDURE — 65660000000 HC RM CCU STEPDOWN

## 2019-09-12 PROCEDURE — 74011000258 HC RX REV CODE- 258: Performed by: HOSPITALIST

## 2019-09-12 PROCEDURE — 74011250637 HC RX REV CODE- 250/637: Performed by: INTERNAL MEDICINE

## 2019-09-12 PROCEDURE — 94762 N-INVAS EAR/PLS OXIMTRY CONT: CPT

## 2019-09-12 PROCEDURE — 80048 BASIC METABOLIC PNL TOTAL CA: CPT

## 2019-09-12 PROCEDURE — 36415 COLL VENOUS BLD VENIPUNCTURE: CPT

## 2019-09-12 PROCEDURE — 74011250637 HC RX REV CODE- 250/637: Performed by: EMERGENCY MEDICINE

## 2019-09-12 PROCEDURE — 74011250636 HC RX REV CODE- 250/636: Performed by: HOSPITALIST

## 2019-09-12 PROCEDURE — 85025 COMPLETE CBC W/AUTO DIFF WBC: CPT

## 2019-09-12 RX ADMIN — DOCUSATE SODIUM 100 MG: 100 CAPSULE, LIQUID FILLED ORAL at 08:14

## 2019-09-12 RX ADMIN — ENOXAPARIN SODIUM 80 MG: 80 INJECTION SUBCUTANEOUS at 06:21

## 2019-09-12 RX ADMIN — POTASSIUM CHLORIDE 40 MEQ: 20 TABLET, EXTENDED RELEASE ORAL at 08:14

## 2019-09-12 RX ADMIN — DOCUSATE SODIUM 100 MG: 100 CAPSULE, LIQUID FILLED ORAL at 17:30

## 2019-09-12 RX ADMIN — POTASSIUM CHLORIDE: 2 INJECTION, SOLUTION, CONCENTRATE INTRAVENOUS at 09:00

## 2019-09-12 RX ADMIN — ASPIRIN 81 MG 81 MG: 81 TABLET ORAL at 08:14

## 2019-09-12 RX ADMIN — ENOXAPARIN SODIUM 80 MG: 80 INJECTION SUBCUTANEOUS at 17:30

## 2019-09-12 RX ADMIN — ATORVASTATIN CALCIUM 40 MG: 40 TABLET, FILM COATED ORAL at 21:38

## 2019-09-12 RX ADMIN — FAMOTIDINE 20 MG: 10 INJECTION INTRAVENOUS at 21:38

## 2019-09-12 RX ADMIN — AMLODIPINE BESYLATE 2.5 MG: 2.5 TABLET ORAL at 08:14

## 2019-09-12 RX ADMIN — Medication 10 ML: at 21:38

## 2019-09-12 RX ADMIN — CARVEDILOL 12.5 MG: 12.5 TABLET, FILM COATED ORAL at 08:14

## 2019-09-12 RX ADMIN — FAMOTIDINE 20 MG: 10 INJECTION INTRAVENOUS at 08:15

## 2019-09-12 RX ADMIN — POTASSIUM CHLORIDE: 2 INJECTION, SOLUTION, CONCENTRATE INTRAVENOUS at 08:17

## 2019-09-12 RX ADMIN — CARVEDILOL 12.5 MG: 12.5 TABLET, FILM COATED ORAL at 17:30

## 2019-09-12 RX ADMIN — POTASSIUM CHLORIDE: 2 INJECTION, SOLUTION, CONCENTRATE INTRAVENOUS at 06:21

## 2019-09-12 RX ADMIN — Medication 10 ML: at 06:27

## 2019-09-12 RX ADMIN — POTASSIUM CHLORIDE: 2 INJECTION, SOLUTION, CONCENTRATE INTRAVENOUS at 07:00

## 2019-09-12 RX ADMIN — Medication 10 ML: at 17:30

## 2019-09-12 NOTE — PROGRESS NOTES
conducted an initial consultation and Spiritual Assessment for Amparo Mckeon, who is a 45 y.o.,female. Patient's Primary Language is: [place]. According to the patient's EMR Congregational Affiliation is: Tenriism. The reason the Patient came to the hospital is:   Patient Active Problem List    Diagnosis Date Noted    Malignant hypertension 09/11/2019    Acute coronary syndrome ([facility] [address] 09/11/2019    ACS (acute coronary syndrome) ([facility] [address] 09/11/2019        The  provided the following Interventions:  Initiated a relationship of care and support. Explored issues of meseret, belief, spirituality and Druze/ritual needs while hospitalized. Listened empathically. Provided chaplaincy education. Provided information about Spiritual Care Services. Offered prayer and assurance of continued prayers on patient's behalf. Chart reviewed. The following outcomes where achieved:  Patient shared limited information about both their medical narrative and spiritual journey/beliefs.  confirmed Patient's Congregational Affiliation. Patient processed feeling about current hospitalization. Patient expressed gratitude for 's visit. Assessment:  Patient does not have any Druze/cultural needs that will affect patient's preferences in health care. There are no spiritual or Druze issues which require intervention at this time. Plan:  Chaplains will continue to follow and will provide pastoral care on an as needed/requested basis.  recommends bedside caregivers page  on duty if patient shows signs of acute spiritual or emotional distress.     [id number] [employer]   [phone]

## 2019-09-12 NOTE — ROUTINE PROCESS
Bedside shift change report given to KAVEH Rodriguez (oncoming nurse) by Carmine Gutierrez RN (offgoing nurse). Report included the following information SBAR, Kardex, Intake/Output and Recent Results and cardiac rhythm NSR.

## 2019-09-12 NOTE — ROUTINE PROCESS
Bedside and Verbal shift change report given to Jayy Mcbride RN (oncoming nurse) by Erich Richards RN   (offgoing nurse). Report included the following information SBAR, Kardex, Procedure Summary, Intake/Output, MAR, Recent Results, Med Rec Status and Cardiac Rhythm NSR.

## 2019-09-12 NOTE — PROGRESS NOTES
[facility] Hospitalist Group  Progress Note    Patient: Tiffany Pires Age: 45 y.o. : 1975 MR#: [id number] SSN: [id number]  Date/Time: 2019    Subjective:     Patient lying in bed in NAD, denies cp , sob or HA    Assessment/Plan:     1. Hypertensive urgency. 2.  Elevated troponin with concern for non-ST elevation myocardial infarction. 3.  History of noncompliance. 4.  History of dyslipidemia. 5.  Empty sella noted on CT scan. 6.  Prior history of hysterectomy. PLAN  S/p cath, medical management per cardio  On asa, BB, statin, lovenox  Monitor BP  curbsided endocrine Dr Beck Angry regarding CT findings of empty sella. He recommended OP f/u in his office. Will order some blood testing . D/w patient regarding need for f/u with ENdocrine Dr Chuy Brink.   D/w patient and fiancee  dispo- home when ok with Cardio    Case discussed with:  [x]Patient  []Family  []Nursing  []Case Management  DVT Prophylaxis:  []Lovenox  []Hep SQ  []SCDs  []Coumadin   []On Heparin gtt    Objective:   VS:   Visit Vitals  BP (!) 164/97   Pulse 69   Temp 98.1 °F (36.7 °C)   Resp 19   Ht 5' (1.524 m)   Wt 76.7 kg (169 lb)   SpO2 100%   BMI 33.01 kg/m²      Tmax/24hrs: Temp (24hrs), Av °F (36.7 °C), Min:97.9 °F (36.6 °C), Max:98.1 °F (36.7 °C)    Input/Output:     Intake/Output Summary (Last 24 hours) at 2019 1334  Last data filed at 2019 0928  Gross per 24 hour   Intake 120 ml   Output 950 ml   Net -830 ml       General:  Awake, alert  Cardiovascular:  S1S2+, RRR  Pulmonary:  CTA b/l  GI:  Soft, BS+, NT, ND  Extremities:  No edema      Labs:    Recent Results (from the past 24 hour(s))   CARDIAC PANEL,(CK, CKMB & TROPONIN)    Collection Time: 19  2:15 PM   Result Value Ref Range     (H) 26 - 192 U/L    CK - MB 49.5 (H) <3.6 ng/ml    CK-MB Index 10.2 (H) 0.0 - 4.0 %    Troponin-I, QT 12.80 (HH) 0.0 - 0.045 NG/ML   MAGNESIUM    Collection Time: 19  2:15 PM   Result Value Ref Range    Magnesium 2.0 1.6 - 2.6 mg/dL   EKG, 12 LEAD, INITIAL    Collection Time: 09/11/19  2:47 PM   Result Value Ref Range    Ventricular Rate 136 BPM    Atrial Rate 105 BPM    QRS Duration 80 ms    Q-T Interval 320 ms    QTC Calculation (Bezet) 481 ms    Calculated R Axis 28 degrees    Calculated T Axis -116 degrees    Diagnosis       Atrial fibrillation with rapid ventricular response  ST & T wave abnormality, consider inferolateral ischemia or digitalis effect  Abnormal ECG  When compared with ECG of 11-Sep-2019 02:31:05  Atrial fibrillation with rapid ventricular response has replaced Sinus rhythm  Confirmed by Kamaljit Dotson ([id number]) on 9/12/2019 8:09:09 AM     CARDIAC PANEL,(CK, CKMB & TROPONIN)    Collection Time: 09/11/19  9:40 PM   Result Value Ref Range     (H) 26 - 192 U/L    CK - MB 30.7 (H) <3.6 ng/ml    CK-MB Index 8.2 (H) 0.0 - 4.0 %    Troponin-I, QT 7.93 (HH) 0.0 - 0.045 NG/ML   HCG URINE, QL    Collection Time: 09/11/19  9:45 PM   Result Value Ref Range    HCG urine, QL NEGATIVE  NEG     CBC WITH AUTOMATED DIFF    Collection Time: 09/12/19  2:43 AM   Result Value Ref Range    WBC 10.6 4.6 - 13.2 K/uL    RBC 4.46 4.20 - 5.30 M/uL    HGB 13.2 12.0 - 16.0 g/dL    HCT 38.7 35.0 - 45.0 %    MCV 86.8 74.0 - 97.0 FL    MCH 29.6 24.0 - 34.0 PG    MCHC 34.1 31.0 - 37.0 g/dL    RDW 13.1 11.6 - 14.5 %    PLATELET 704 278 - 362 K/uL    MPV 10.4 9.2 - 11.8 FL    NEUTROPHILS 68 40 - 73 %    LYMPHOCYTES 23 21 - 52 %    MONOCYTES 8 3 - 10 %    EOSINOPHILS 1 0 - 5 %    BASOPHILS 0 0 - 2 %    ABS. NEUTROPHILS 7.2 1.8 - 8.0 K/UL    ABS. LYMPHOCYTES 2.4 0.9 - 3.6 K/UL    ABS. MONOCYTES 0.9 0.05 - 1.2 K/UL    ABS. EOSINOPHILS 0.1 0.0 - 0.4 K/UL    ABS.  BASOPHILS 0.0 0.0 - 0.1 K/UL    DF AUTOMATED     METABOLIC PANEL, BASIC    Collection Time: 09/12/19  2:43 AM   Result Value Ref Range    Sodium 137 136 - 145 mmol/L    Potassium 2.9 (LL) 3.5 - 5.5 mmol/L    Chloride 102 100 - 111 mmol/L    CO2 27 21 - 32 mmol/L Anion gap 8 3.0 - 18 mmol/L    Glucose 84 74 - 99 mg/dL    BUN 12 7.0 - 18 MG/DL    Creatinine 0.79 0.6 - 1.3 MG/DL    BUN/Creatinine ratio 15 12 - 20      GFR est AA >60 >60 ml/min/1.73m2    GFR est non-AA >60 >60 ml/min/1.73m2    Calcium 8.1 (L) 8.5 - 10.1 MG/DL   MAGNESIUM    Collection Time: 09/12/19  2:43 AM   Result Value Ref Range    Magnesium 2.5 1.6 - 2.6 mg/dL   EKG, 12 LEAD, INITIAL    Collection Time: 09/12/19  8:34 AM   Result Value Ref Range    Ventricular Rate 66 BPM    Atrial Rate 66 BPM    P-R Interval 170 ms    QRS Duration 84 ms    Q-T Interval 460 ms    QTC Calculation (Bezet) 482 ms    Calculated P Axis 57 degrees    Calculated T Axis -60 degrees    Diagnosis       Normal sinus rhythm  Minimal voltage criteria for LVH, may be normal variant  T wave abnormality, consider inferolateral ischemia  Prolonged QT  Abnormal ECG  When compared with ECG of 11-SEP-2019 14:47,  Significant changes have occurred     ECHO ADULT COMPLETE    Collection Time: 09/12/19  8:55 AM   Result Value Ref Range    LA Volume 62.64 22 - 52 mL    Ao Root D 2.53 cm    LVIDd 4.20 3.9 - 5.3 cm    LVPWd 1.17 (A) 0.6 - 0.9 cm    LVIDs 2.46 cm    IVSd 1.36 (A) 0.6 - 0.9 cm    LVOT d 1.76 cm    LVOT Peak Velocity 82.97 cm/s    LVOT Peak Gradient 2.8 mmHg    LVOT VTI 18.06 cm    MV A Attila 78.58 cm/s    MV E Attila 65.75 cm/s    MV E/A 0.84     LA Vol 4C 58.17 (A) 22 - 52 mL    LA Vol 2C 60.31 (A) 22 - 52 mL    LA Area 4C 19.7 cm2    LV Mass .1 (A) 67 - 162 g    LV Mass AL Index 130.7 (A) 43 - 95 g/m2    Mitral Valve E Wave Deceleration Time 194.1 ms    Triscuspid Valve Regurgitation Peak Gradient 21.8 mmHg    TR Max Velocity 233.45 cm/s    LA Vol Index 36.05 16 - 28 ml/m2    LA Vol Index 34.71 16 - 28 ml/m2    LA Vol Index 33.48 16 - 28 ml/m2     Additional Data Reviewed:      Signed By: Bryanna Stinson MD     September 12, 2019

## 2019-09-12 NOTE — PROGRESS NOTES
[facility]  -  Progress Note      Patient: Lizette Oneal MRN: [id number]  SSN: [id number]    YOB: 1975  Age: 45 y.o. Sex: female      Admit Date: 9/11/2019    Assessment:     -NSTEMI, had episode of chest pain x 2-3 seconds PTA associated with left 4th/5th finger numbness/tingling and diaphoresis, which lasted hours. Troponin 2.74 --> 14.40. Has been nauseous and had V x 4 since being given NTG in the ER.    -HTN, uncontrolled, /120 initially, has been running ~220/140-160 at home. She was previously on HCTZ but not on any medication x 1 year.  -Hyperlipidemia, Chol 216, HDL 63, .8 on 9/11/2019  -Hypokalemia  -S/p hysterectomy approx. 2 years ago     No PCP or cardiologist.    Plan:     -Continue cardiac medication regimen to include ASA, Lipitor, Coreg, Norvasc.  -K noted to be low again this AM, given total of 40 mEq IV KCl along with 40 mEq PO KCl.  -Continued on therapeutic Lovenox. Subjective:     No new complaints. Feeling better. Denies chest pain.     Objective:      Patient Vitals for the past 8 hrs:   Temp Pulse Resp BP SpO2   09/12/19 0828    (!) 164/97    09/12/19 0756 98.1 °F (36.7 °C) 69 19 (!) 141/93 100 %   09/12/19 0400 98 °F (36.7 °C) 89 24 140/60 100 %         Patient Vitals for the past 96 hrs:   Weight   09/12/19 0828 169 lb (76.7 kg)   09/11/19 0149 169 lb (76.7 kg)         Intake/Output Summary (Last 24 hours) at 9/12/2019 1049  Last data filed at 9/12/2019 5947  Gross per 24 hour   Intake 120 ml   Output 950 ml   Net -830 ml       Physical Exam:  General:  alert, cooperative, no distress, appears stated age  Neck:  Supple, no JVD  Lungs:  clear to auscultation bilaterally  Heart:  Regular rate and rhythm  Abdomen:  abdomen is soft without significant tenderness, masses, organomegaly or guarding  Extremities:  Atraumatic, no edema     Data Review:     Labs: Results:       Chemistry Recent Labs     09/12/19  0243 09/11/19  2269 09/11/19 0236   GLU 84  --  113*     --  143   K 2.9*  --  3.3*     --  107   CO2 27  --  28   BUN 12  --  14   CREA 0.79  --  0.75   CA 8.1*  --  8.8   MG 2.5 2.0  --    AGAP 8  --  8   BUCR 15  --  19      CBC w/Diff Recent Labs     09/12/19  0243 09/11/19 0236   WBC 10.6 8.9   RBC 4.46 4.68   HGB 13.2 14.0   HCT 38.7 41.7    257   GRANS 68 66   LYMPH 23 26   EOS 1 2      Cardiac Enzymes Lab Results   Component Value Date/Time     (H) 09/11/2019 09:40 PM     (H) 09/11/2019 02:15 PM    CKMB 30.7 (H) 09/11/2019 09:40 PM    CKMB 49.5 (H) 09/11/2019 02:15 PM    CKND1 8.2 (H) 09/11/2019 09:40 PM    CKND1 10.2 (H) 09/11/2019 02:15 PM    TROIQ 7.93 ([facility]) 09/11/2019 09:40 PM    TROIQ 12.80 () 09/11/2019 02:15 PM      Coagulation Recent Labs     09/11/19 0236   PTP 12.0   INR 0.9   APTT 28.5       Lipid Panel Lab Results   Component Value Date/Time    Cholesterol, total 216 (H) 09/11/2019 10:21 AM    HDL Cholesterol 63 (H) 09/11/2019 10:21 AM    LDL, calculated 137.8 (H) 09/11/2019 10:21 AM    VLDL, calculated 15.2 09/11/2019 10:21 AM    Triglyceride 76 09/11/2019 10:21 AM    CHOL/HDL Ratio 3.4 09/11/2019 10:21 AM

## 2019-09-12 NOTE — PROGRESS NOTES
Echocardiogram completed. Patient returned to room with armband in place. Report to follow.     [address] [facility]

## 2019-09-13 ENCOUNTER — HOME HEALTH ADMISSION (OUTPATIENT)
Dept: HOME HEALTH SERVICES | Facility: HOME HEALTH | Age: 44
End: 2019-09-13

## 2019-09-13 VITALS
DIASTOLIC BLOOD PRESSURE: 96 MMHG | SYSTOLIC BLOOD PRESSURE: 153 MMHG | HEART RATE: 61 BPM | RESPIRATION RATE: 19 BRPM | HEIGHT: 60 IN | WEIGHT: 177.4 LBS | OXYGEN SATURATION: 99 % | TEMPERATURE: 97.7 F | BODY MASS INDEX: 34.83 KG/M2

## 2019-09-13 LAB
ANION GAP SERPL CALC-SCNC: 9 MMOL/L (ref 3–18)
BUN SERPL-MCNC: 13 MG/DL (ref 7–18)
BUN/CREAT SERPL: 15 (ref 12–20)
CALCIUM SERPL-MCNC: 8.3 MG/DL (ref 8.5–10.1)
CHLORIDE SERPL-SCNC: 111 MMOL/L (ref 100–111)
CO2 SERPL-SCNC: 24 MMOL/L (ref 21–32)
CREAT SERPL-MCNC: 0.86 MG/DL (ref 0.6–1.3)
GLUCOSE SERPL-MCNC: 137 MG/DL (ref 74–99)
POTASSIUM SERPL-SCNC: 3.8 MMOL/L (ref 3.5–5.5)
SODIUM SERPL-SCNC: 144 MMOL/L (ref 136–145)
T4 FREE SERPL-MCNC: 0.8 NG/DL (ref 0.7–1.5)
TSH SERPL DL<=0.05 MIU/L-ACNC: 2.98 UIU/ML (ref 0.36–3.74)

## 2019-09-13 PROCEDURE — 84146 ASSAY OF PROLACTIN: CPT

## 2019-09-13 PROCEDURE — 36415 COLL VENOUS BLD VENIPUNCTURE: CPT

## 2019-09-13 PROCEDURE — 74011250637 HC RX REV CODE- 250/637: Performed by: EMERGENCY MEDICINE

## 2019-09-13 PROCEDURE — 74011250637 HC RX REV CODE- 250/637: Performed by: INTERNAL MEDICINE

## 2019-09-13 PROCEDURE — 84305 ASSAY OF SOMATOMEDIN: CPT

## 2019-09-13 PROCEDURE — 84439 ASSAY OF FREE THYROXINE: CPT

## 2019-09-13 PROCEDURE — 80048 BASIC METABOLIC PNL TOTAL CA: CPT

## 2019-09-13 PROCEDURE — 83001 ASSAY OF GONADOTROPIN (FSH): CPT

## 2019-09-13 PROCEDURE — 86376 MICROSOMAL ANTIBODY EACH: CPT

## 2019-09-13 PROCEDURE — 74011250636 HC RX REV CODE- 250/636: Performed by: EMERGENCY MEDICINE

## 2019-09-13 PROCEDURE — 84443 ASSAY THYROID STIM HORMONE: CPT

## 2019-09-13 PROCEDURE — 74011000250 HC RX REV CODE- 250: Performed by: EMERGENCY MEDICINE

## 2019-09-13 RX ORDER — AMLODIPINE BESYLATE 5 MG/1
5 TABLET ORAL DAILY
Status: DISCONTINUED | OUTPATIENT
Start: 2019-09-14 | End: 2019-09-13 | Stop reason: HOSPADM

## 2019-09-13 RX ORDER — AMLODIPINE BESYLATE 5 MG/1
5 TABLET ORAL DAILY
Qty: 30 TAB | Refills: 0 | Status: SHIPPED | OUTPATIENT
Start: 2019-09-14 | End: 2019-10-09 | Stop reason: SDUPTHER

## 2019-09-13 RX ORDER — CARVEDILOL 12.5 MG/1
12.5 TABLET ORAL 2 TIMES DAILY WITH MEALS
Qty: 60 TAB | Refills: 0 | Status: SHIPPED | OUTPATIENT
Start: 2019-09-13 | End: 2019-10-09 | Stop reason: SDUPTHER

## 2019-09-13 RX ORDER — DOCUSATE SODIUM 100 MG/1
100 CAPSULE, LIQUID FILLED ORAL 2 TIMES DAILY
Qty: 60 CAP | Refills: 0 | Status: SHIPPED | OUTPATIENT
Start: 2019-09-13

## 2019-09-13 RX ORDER — AMLODIPINE BESYLATE 2.5 MG/1
2.5 TABLET ORAL
Status: COMPLETED | OUTPATIENT
Start: 2019-09-13 | End: 2019-09-13

## 2019-09-13 RX ORDER — ATORVASTATIN CALCIUM 40 MG/1
40 TABLET, FILM COATED ORAL
Qty: 30 TAB | Refills: 0 | Status: SHIPPED | OUTPATIENT
Start: 2019-09-13 | End: 2019-10-09 | Stop reason: SDUPTHER

## 2019-09-13 RX ORDER — GUAIFENESIN 100 MG/5ML
81 LIQUID (ML) ORAL DAILY
Qty: 30 TAB | Refills: 0 | Status: SHIPPED | OUTPATIENT
Start: 2019-09-14

## 2019-09-13 RX ORDER — AMLODIPINE BESYLATE 2.5 MG/1
2.5 TABLET ORAL DAILY
Qty: 30 TAB | Refills: 0 | Status: SHIPPED | OUTPATIENT
Start: 2019-09-14 | End: 2019-09-13

## 2019-09-13 RX ORDER — NITROGLYCERIN 0.4 MG/1
0.4 TABLET SUBLINGUAL AS NEEDED
Qty: 20 TAB | Refills: 0 | Status: SHIPPED | OUTPATIENT
Start: 2019-09-13

## 2019-09-13 RX ADMIN — FAMOTIDINE 20 MG: 10 INJECTION INTRAVENOUS at 10:13

## 2019-09-13 RX ADMIN — Medication 10 ML: at 06:00

## 2019-09-13 RX ADMIN — DOCUSATE SODIUM 100 MG: 100 CAPSULE, LIQUID FILLED ORAL at 10:13

## 2019-09-13 RX ADMIN — AMLODIPINE BESYLATE 2.5 MG: 2.5 TABLET ORAL at 10:13

## 2019-09-13 RX ADMIN — ASPIRIN 81 MG 81 MG: 81 TABLET ORAL at 10:13

## 2019-09-13 RX ADMIN — CARVEDILOL 12.5 MG: 12.5 TABLET, FILM COATED ORAL at 10:13

## 2019-09-13 RX ADMIN — AMLODIPINE BESYLATE 2.5 MG: 2.5 TABLET ORAL at 14:18

## 2019-09-13 RX ADMIN — POTASSIUM CHLORIDE 40 MEQ: 20 TABLET, EXTENDED RELEASE ORAL at 10:13

## 2019-09-13 RX ADMIN — ENOXAPARIN SODIUM 80 MG: 80 INJECTION SUBCUTANEOUS at 07:02

## 2019-09-13 NOTE — HOME CARE
Discharge noted for today. Received home health referral for [facility] for [facility]  - NSTEMI. Order processed and emailed to central office.   Oswaldo Santos, [facility] Liaison

## 2019-09-13 NOTE — ROUTINE PROCESS
Bedside and Verbal shift change report given to [facility], RN (oncoming nurse) by Rosa Alba RN   (offgoing nurse). Report included the following information SBAR, Kardex, Intake/Output, MAR, Recent Results, Med Rec Status and Cardiac Rhythm NSR.

## 2019-09-13 NOTE — ROUTINE PROCESS
Received report on pt.from off going RN. Resting in bed. No acute distress noted. Will cont to monitor for any changes in status. 1530 discharge inst and prescriptions given to pt. Expressed understanding. 1540 to exit ambulatory. Declined w/c transport. No distress noted at time of discharge.

## 2019-09-13 NOTE — PROGRESS NOTES
Patient in NAD, awake, alert. Denies cp. Cardio input noted. Home today. Will increase norvasc. Discussed discharge plans with patient. D/w .

## 2019-09-13 NOTE — PROGRESS NOTES
D/C orders received. Hospital to home visit arranged with [facility]. Chart reviewed. Pt will be transported home by boyfriend.  available as needed.     Garfield Villalobos, RN  Care Manager  [phone]

## 2019-09-13 NOTE — PROGRESS NOTES
[facility]  -  Progress Note      Patient: Liliana Welch MRN: [id number]  SSN: [id number]    YOB: 1975  Age: 45 y.o. Sex: female      Admit Date: 9/11/2019    Assessment:     Hospital Problems  Never Reviewed          Codes Class Noted POA    Malignant hypertension ICD-10-CM: I10  ICD-9-CM: 401.0  9/11/2019 Unknown        Acute coronary syndrome [facility]) ICD-10-CM: I24.9  ICD-9-CM: 411.1  9/11/2019 Unknown        ACS (acute coronary syndrome) ([facility] [address] ICD-10-CM: I24.9  ICD-9-CM: 411.1  9/11/2019 Unknown            -NSTEMI, had episode of chest pain x 2-3 seconds PTA associated with left 4th/5th finger numbness/tingling and diaphoresis, which lasted hours. Troponin 2.74 --> 14.40.  Has been nauseous and had V x 4 since being given NTG in the ER.    -HTN, uncontrolled, /120 initially, has been running ~220/140-160 at home. Sydni Spaulding was previously on HCTZ but not on any medication x 1 year.  -Hyperlipidemia, Chol 216, HDL 63, .8 on 9/11/2019  -Hypokalemia  -S/p hysterectomy approx. 2 years ago     No PCP or cardiologist.    Plan:     If her potassium is well replaced, she can likely be discharged home from cardiac standpoint. She will need follow-up with me in 4 weeks at our [facility] office. Subjective:     No new complaints.      Objective:      Patient Vitals for the past 8 hrs:   Temp Pulse Resp BP SpO2   09/13/19 0826 98.1 °F (36.7 °C) 66 19 (!) 183/108 97 %   09/13/19 0403 98 °F (36.7 °C) 60 18 (!) 176/91          Patient Vitals for the past 96 hrs:   Weight   09/13/19 0403 80.5 kg (177 lb 6.4 oz)   09/12/19 0828 76.7 kg (169 lb)   09/11/19 0149 76.7 kg (169 lb)         Intake/Output Summary (Last 24 hours) at 9/13/2019 0936  Last data filed at 9/13/2019 0403  Gross per 24 hour   Intake 120 ml   Output 900 ml   Net -780 ml       Physical Exam:  General:  alert, cooperative, no distress, appears stated age  Neck:  no JVD  Lungs:  clear to auscultation bilaterally  Heart:  regular rate and rhythm  Abdomen:  no guarding or rigidity  Extremities:  no edema    Data Review:     Labs: Results:       Chemistry Recent Labs     09/12/19  0243 09/11/19  1415 09/11/19  0236   GLU 84  --  113*     --  143   K 2.9*  --  3.3*     --  107   CO2 27  --  28   BUN 12  --  14   CREA 0.79  --  0.75   CA 8.1*  --  8.8   MG 2.5 2.0  --    AGAP 8  --  8   BUCR 15  --  19      CBC w/Diff Recent Labs     09/12/19  0243 09/11/19  0236   WBC 10.6 8.9   RBC 4.46 4.68   HGB 13.2 14.0   HCT 38.7 41.7    257   GRANS 68 66   LYMPH 23 26   EOS 1 2      Cardiac Enzymes No results found for: CPK, CK, CKMMB, CKMB, RCK3, CKMBT, CKNDX, CKND1, PAGE, TROPT, TROIQ, MORENA, TROPT, TNIPOC, BNP, BNPP   Coagulation Recent Labs     09/11/19 0236   PTP 12.0   INR 0.9   APTT 28.5       Lipid Panel Lab Results   Component Value Date/Time    Cholesterol, total 216 (H) 09/11/2019 10:21 AM    HDL Cholesterol 63 (H) 09/11/2019 10:21 AM    LDL, calculated 137.8 (H) 09/11/2019 10:21 AM    VLDL, calculated 15.2 09/11/2019 10:21 AM    Triglyceride 76 09/11/2019 10:21 AM    CHOL/HDL Ratio 3.4 09/11/2019 10:21 AM      BNP No results found for: BNP, BNPP, XBNPT   Liver Enzymes No results for input(s): TP, ALB, TBIL, AP, SGOT, GPT in the last 72 hours.     No lab exists for component: DBIL   Digoxin    Thyroid Studies Lab Results   Component Value Date/Time    TSH 2.98 09/13/2019 05:03 AM

## 2019-09-13 NOTE — DISCHARGE INSTRUCTIONS
DISCHARGE SUMMARY from Nurse    PATIENT INSTRUCTIONS:    After general anesthesia or intravenous sedation, for 24 hours or while taking prescription Narcotics:  · Limit your activities  · Do not drive and operate hazardous machinery  · Do not make important personal or business decisions  · Do  not drink alcoholic beverages  · If you have not urinated within 8 hours after discharge, please contact your surgeon on call. Report the following to your surgeon:  · Excessive pain, swelling, redness or odor of or around the surgical area  · Temperature over 100.5  · Nausea and vomiting lasting longer than 4 hours or if unable to take medications  · Any signs of decreased circulation or nerve impairment to extremity: change in color, persistent  numbness, tingling, coldness or increase pain  · Any questions    What to do at Home:  Recommended activity: Activity as tolerated. If you experience any of the following symptoms fever, bleeding, chest pain/pain, shortness of breath, swelling,or any other concerns please follow up with PCP or go to the nearest ED. *  Please give a list of your current medications to your Primary Care Provider. *  Please update this list whenever your medications are discontinued, doses are      changed, or new medications (including over-the-counter products) are added. *  Please carry medication information at all times in case of emergency situations. These are general instructions for a healthy lifestyle:    No smoking/ No tobacco products/ Avoid exposure to second hand smoke  Surgeon General's Warning:  Quitting smoking now greatly reduces serious risk to your health.     Obesity, smoking, and sedentary lifestyle greatly increases your risk for illness    A healthy diet, regular physical exercise & weight monitoring are important for maintaining a healthy lifestyle    You may be retaining fluid if you have a history of heart failure or if you experience any of the following symptoms:  Weight gain of 3 pounds or more overnight or 5 pounds in a week, increased swelling in our hands or feet or shortness of breath while lying flat in bed. Please call your doctor as soon as you notice any of these symptoms; do not wait until your next office visit. The discharge information has been reviewed with the patient. The patient verbalized understanding. Discharge medications reviewed with the patient and appropriate educational materials and side effects teaching were provided. [employer] Activation    Thank you for requesting access to [employer]. Please follow the instructions below to securely access and download your online medical record. [employer] allows you to send messages to your doctor, view your test results, renew your prescriptions, schedule appointments, and more. How Do I Sign Up? 1. In your internet browser, go to [email or web address]  2. Click on the First Time User? Click Here link in the Sign In box. You will be redirect to the New Member Sign Up page. 3. Enter your [employer] Access Code exactly as it appears below. You will not need to use this code after youve completed the sign-up process. If you do not sign up before the expiration date, you must request a new code. [employer] Access Code: [email or web address]  Expires: 10/26/2019  1:40 AM (This is the date your [employer] access code will )    4. Enter the last four digits of your Social Security Number (xxxx) and Date of Birth (mm/dd/yyyy) as indicated and click Submit. You will be taken to the next sign-up page. 5. Create a [employer] ID. This will be your [employer] login ID and cannot be changed, so think of one that is secure and easy to remember. 6. Create a [employer] password. You can change your password at any time. 7. Enter your Password Reset Question and Answer. This can be used at a later time if you forget your password. 8. Enter your e-mail address.  You will receive e-mail notification when new information is available in [employer]. 9. Click Sign Up. You can now view and download portions of your medical record. 10. Click the Download Summary menu link to download a portable copy of your medical information. Additional Information    If you have questions, please visit the Frequently Asked Questions section of the [employer] website at [email or web address]. [employer]. [email or web address] Remember, [employer] is NOT to be used for urgent needs. For medical emergencies, dial 911.     Patient armband removed and shredded  ___________________________________________________________________________________________________________________________________

## 2019-09-13 NOTE — CDMP QUERY
Pt admitted with NSTEMI  Pt noted to have Hypertensive urgency per H&P ( codes to  Essential HTN)  If possible, please document in progress notes and d/c summary if you are evaluating and /or treating any of the following:     Hypertensive Crisis   Hypertensive Emergency   Hypertensive Urgency   Other, please specify   Clinically unable to determine    The medical record reflects the following:    Risk Factors: PMH HTN, stopped meds 2/2 lack of insurance/PCP    Clinical Indicators:Adm BPs 210/120 181/112 192/113   ED- Positive for numbness and headaches. Pt c/o elevated blood pressure & headache for several days. Pt states she has been out of bp medication for approx. 8 months. Treatment: IV metoprolol in ED,  p.r.n. hydralazine. po amlodipine    Hypertensive crisis: Blood pressure reading of systolic BP of > 945 mmHG OR diastolic BP > 418 mmHG and includes damage to blood vessels, including inflammation, leakage of fluid or blood and can cause stroke, headache, heart failure and eclampsia. Hypertensive Urgency: Hypertensive crisis without organ damage. Symptoms may or may not be present, but can include: severe headache, shortness of breath, nosebleeds, severe anxiety. Treatment: adjustment of oral blood pressure medications; IV medication is not usually required. These patients may not be admitted. Hypertensive Emergency: Hypertensive crisis with organ damage. These patients require immediate treatment of blood pressure, most often with IV medications, and are usually admitted to ICU and require frequent monitoring (vital signs, neuro checks, telemetry). Individuals without a history of hypertension may experience organ damage with blood pressures lower than those typically associated with hypertensive crisis. Symptoms are consistent with the organ damage that has occurred: chest pain, back pain, difficulty speaking, numbness/weakness, shortness of breath, visual changes.  Types of organ damage due to hypertensive emergency include: acute MI, acute kidney failure, angina, aortic dissection, acute or acute-on-chronic CHF, CVA/Stroke, pulmonary edema      Thank you,  [facility], [address] [place] [address]

## 2019-09-14 LAB
FSH SERPL-ACNC: 9.1 MIU/ML
LH SERPL-ACNC: 5.2 MIU/ML
PROLACTIN SERPL-MCNC: 16.4 NG/ML

## 2019-09-15 ENCOUNTER — HOME CARE VISIT (OUTPATIENT)
Dept: HOME HEALTH SERVICES | Facility: HOME HEALTH | Age: 44
End: 2019-09-15

## 2019-09-15 LAB
IGF-I SERPL-MCNC: 160 NG/ML (ref 62–204)
THYROGLOB AB SERPL-ACNC: <1 IU/ML (ref 0–0.9)
THYROPEROXIDASE AB SERPL-ACNC: 101 IU/ML (ref 0–34)

## 2019-09-18 ENCOUNTER — HOME CARE VISIT (OUTPATIENT)
Dept: HOME HEALTH SERVICES | Facility: HOME HEALTH | Age: 44
End: 2019-09-18

## 2019-09-19 ENCOUNTER — HOME CARE VISIT (OUTPATIENT)
Dept: HOME HEALTH SERVICES | Facility: HOME HEALTH | Age: 44
End: 2019-09-19

## 2019-10-01 NOTE — DISCHARGE SUMMARY
[address] [place] [address]    Name:  Herminio Matthews  MR#:   [id number]  :  1975  ACCOUNT #:  [de-identified]  ADMIT DATE:  2019  DISCHARGE DATE:  2019    PRIMARY CARE PHYSICIAN:  Bryanna Larry NP.    DISCHARGE DIAGNOSES:  Include:  1. Hypertensive urgency at the time of admission. 2.  Non-ST elevation myocardial infarction. 3.  Coronary artery disease. 4.  History of noncompliance. 5.  History of dyslipidemia. 6.  Empty sella noted on CT scan. 7.  History of a hysterectomy in the past.    HOSPITAL COURSE:  This is a 43-year-old female who presented to the ED with some chest pain and headache. The patient was evaluated. The patient was noted to have significantly elevated blood pressure. The patient was started on a blood pressure medication regimen. Blood pressure showed improvement. The patient had elevated troponins. Cardiology was consulted. Cardiac biomarkers were trended. The patient subsequently underwent a cardiac catheterization, which showed some distal coronary artery disease for which Cardiology recommended medical management. On the initial CT scan, it had shown an empty sella. I discussed this with the patient. I also discussed this with Endocrine who recommended outpatient followup in the office. I discussed with the patient and significant other the importance of following up with the endocrinologist regarding the finding of empty sella on the CT scan. They verbalized understanding and stated that they would follow up with the endocrinologist as soon as possible. The patient was counseled regarding compliance with medications and regular followup with physicians. Discharge plans were discussed with the patient. The patient was advised to follow up with the PCP in one week, Cardiology in two weeks, and Dr. Roberto Rice, Endocrinology, in three weeks. DISPOSITION:  Home. CONSULTATIONS:  With Cardiology.     PROCEDURE:  Cardiac catheterization. The patient was hemodynamically stable at the time of discharge. DISCHARGE MEDICATIONS:  Included:  1. Aspirin 81 mg p.o. daily. 2.  Lipitor 40 mg p.o. daily. 3.  Coreg 12.5 mg p.o. twice daily. 4.  Colace 100 mg p.o. twice daily. 5.  Sublingual nitroglycerin p.r.n. chest pain. 6.  Amlodipine 5 mg p.o. daily. 7.  Check CBC, CMP, and magnesium in five days with results to PCP immediately. 8.  The patient was given a work note. Total time for the discharge is more than 35 minutes.       Roshni Suarez MD      [id number]  D:  10/01/2019 9:56  T:  10/01/2019 14:57  JOB #:  [id number]  CC:  Magdalena Ennis NP

## 2019-10-09 ENCOUNTER — OFFICE VISIT (OUTPATIENT)
Dept: CARDIOLOGY CLINIC | Age: 44
End: 2019-10-09

## 2019-10-09 VITALS
HEIGHT: 60 IN | BODY MASS INDEX: 35.34 KG/M2 | DIASTOLIC BLOOD PRESSURE: 80 MMHG | OXYGEN SATURATION: 96 % | WEIGHT: 180 LBS | HEART RATE: 81 BPM | SYSTOLIC BLOOD PRESSURE: 112 MMHG

## 2019-10-09 DIAGNOSIS — E66.01 SEVERE OBESITY (HCC): ICD-10-CM

## 2019-10-09 RX ORDER — AMLODIPINE BESYLATE 5 MG/1
5 TABLET ORAL DAILY
Qty: 90 TAB | Refills: 3 | Status: SHIPPED | OUTPATIENT
Start: 2019-10-09 | End: 2020-10-28

## 2019-10-09 RX ORDER — CARVEDILOL 12.5 MG/1
12.5 TABLET ORAL 2 TIMES DAILY WITH MEALS
Qty: 180 TAB | Refills: 3 | Status: SHIPPED | OUTPATIENT
Start: 2019-10-09 | End: 2020-10-28

## 2019-10-09 RX ORDER — CLOPIDOGREL BISULFATE 75 MG/1
75 TABLET ORAL DAILY
Qty: 30 TAB | Refills: 0 | Status: SHIPPED | OUTPATIENT
Start: 2019-10-09 | End: 2019-11-03 | Stop reason: SDUPTHER

## 2019-10-09 RX ORDER — ATORVASTATIN CALCIUM 40 MG/1
40 TABLET, FILM COATED ORAL
Qty: 90 TAB | Refills: 3 | Status: SHIPPED | OUTPATIENT
Start: 2019-10-09

## 2019-10-09 NOTE — PROGRESS NOTES
1. Have you been to the ER, urgent care clinic since your last visit? Hospitalized since your last visit? Yes When: 9/12/19 Where: [facility] Reason for visit: CP    2. Have you seen or consulted any other health care providers outside of the [address] [place] [address] since your last visit? Include any pap smears or colon screening. No    3. Since your last visit, have you had any of the following symptoms? CP off and on, Palpitation         4. Have you had any blood work, X-rays or cardiac testing? [facility]     Requested: yes     In [facility]: Yes    5. Where do you normally have your labs drawn? [facility]    6. Do you need any refills today?    all

## 2019-10-09 NOTE — PROGRESS NOTES
Karthikeyan Fraser    Chief Complaint   Patient presents with   [facility] Follow Up     3-4 week hospital follow up for Chest Pain    Chest Pain       HPI    Karthikeyan Fraser is a 45 y.o. s/p NSTEMI    -NSTEMI, had episode of chest pain x 2-3 seconds PTA associated with left 4th/5th finger numbness/tingling and diaphoresis, which lasted hours. Troponin 2.74 --> 14.40.  Has been nauseous and had V x 4 since being given NTG in the ER.    -had pAFib with RVR episode while in hospital (see ekg)  -HTN, uncontrolled, /120 initially, has been running ~220/140-160 at home. Sharri Cannon was previously on HCTZ but not on any medication x 1 year.  -Hyperlipidemia, Chol 216, HDL 63, .8 on 9/11/2019  -Hypokalemia  -S/p hysterectomy approx. 2 years ago    She was discharged on medical therapy (but no DAPT). No cardiac rehab. She has had CP since discharge- once time, and she took SL NTG- says went away \"but I hate that medicine\" as she got a bad headache and felt worse with after taking. Past Medical History:   Diagnosis Date    HLD (hyperlipidemia)     Hypertension        Past Surgical History:   Procedure Laterality Date    HX HYSTERECTOMY         Current Outpatient Medications   Medication Sig Dispense Refill    atorvastatin (LIPITOR) 40 mg tablet Take 1 Tab by mouth nightly. 90 Tab 3    carvedilol (COREG) 12.5 mg tablet Take 1 Tab by mouth two (2) times daily (with meals). 180 Tab 3    amLODIPine (NORVASC) 5 mg tablet Take 1 Tab by mouth daily. 90 Tab 3    clopidogrel (PLAVIX) 75 mg tab Take 1 Tab by mouth daily. For 1 month then resume aspirin 30 Tab 0    aspirin 81 mg chewable tablet Take 1 Tab by mouth daily. 30 Tab 0    nitroglycerin (NITROSTAT) 0.4 mg SL tablet 1 Tab by SubLINGual route as needed for Chest Pain. Up to 3 doses. 20 Tab 0    docusate sodium (COLACE) 100 mg capsule Take 1 Cap by mouth two (2) times a day.  60 Cap 0    OTHER Check CBC, CMP, Mg in 5 days, results to PCP immediately, Diagnosis- CAD 1 Each 0    OTHER This is to certify that Rosa Elena Zamora was admitted to [facility] on 09/11/19 and discharged on 09/13/19 , and has been advised to take rest at home for 14 more days and then resume work if symptom free. 1 Each 0       No Known Allergies    Social History     Socioeconomic History    Marital status: SINGLE     Spouse name: Not on file    Number of children: Not on file    Years of education: Not on file    Highest education level: Not on file   Occupational History    Not on file   Social Needs    Financial resource strain: Not on file    Food insecurity:     Worry: Not on file     Inability: Not on file    Transportation needs:     Medical: Not on file     Non-medical: Not on file   Tobacco Use    Smoking status: Never Smoker    Smokeless tobacco: Never Used   Substance and Sexual Activity    Alcohol use: No    Drug use: No    Sexual activity: Not on file   Lifestyle    Physical activity:     Days per week: Not on file     Minutes per session: Not on file    Stress: Not on file   Relationships    Social connections:     Talks on phone: Not on file     Gets together: Not on file     Attends Muslim service: Not on file     Active member of club or organization: Not on file     Attends meetings of clubs or organizations: Not on file     Relationship status: Not on file    Intimate partner violence:     Fear of current or ex partner: Not on file     Emotionally abused: Not on file     Physically abused: Not on file     Forced sexual activity: Not on file   Other Topics Concern    Not on file   Social History Narrative    Not on file        FH: neg premature CAD and SCD    Review of Systems    14 pt Review of Systems is negative unless otherwise mentioned in the HPI.     Wt Readings from Last 3 Encounters:   10/09/19 81.6 kg (180 lb)   09/13/19 80.5 kg (177 lb 6.4 oz)   10/11/18 77.6 kg (171 lb)     Temp Readings from Last 3 Encounters:   09/13/19 97.7 °F (36.5 °C)   10/11/18 98.5 °F (36.9 °C)     BP Readings from Last 3 Encounters:   10/09/19 112/80   09/13/19 (!) 153/96   10/12/18 161/89     Pulse Readings from Last 3 Encounters:   10/09/19 81   09/13/19 61   10/12/18 64       09/11/19   ECHO ADULT COMPLETE 09/12/2019 9/12/2019    Narrative · Left Ventricle: Normal cavity size, systolic function (ejection fraction   normal) and diastolic function. Mild concentric hypertrophy. Estimated   left ventricular ejection fraction is 56 - 60%. Visually measured ejection   fraction. No regional wall motion abnormality noted. · Tricuspid Valve: Mild tricuspid valve regurgitation is present. · Left Atrium: Mildly dilated left atrium. · Pulmonary Artery: Pulmonary arterial systolic pressure is 25 mmHg. · Mitral Valve: Mild mitral valve regurgitation is present. Signed by: Everardo Espino MD       Physical Exam:    Visit Vitals  /80 (BP 1 Location: Right arm, BP Patient Position: Sitting)   Pulse 81   Ht 5' (1.524 m)   Wt 81.6 kg (180 lb)   SpO2 96%   BMI 35.15 kg/m²      Physical Exam   Constitutional: She is oriented to person, place, and time. She appears well-developed and well-nourished. HENT:   Head: Normocephalic and atraumatic. Eyes: Pupils are equal, round, and reactive to light. EOM are normal.   Neck: No JVD present. Cardiovascular: Normal rate, regular rhythm, normal heart sounds and intact distal pulses. Exam reveals no gallop and no friction rub. No murmur heard. Pulmonary/Chest: Effort normal and breath sounds normal. No respiratory distress. She has no wheezes. She has no rales. She exhibits no tenderness. Abdominal: Soft. Bowel sounds are normal.   Musculoskeletal: She exhibits no edema or tenderness. Neurological: She is alert and oriented to person, place, and time. Skin: Skin is warm and dry. Psychiatric: She has a normal mood and affect.        EKG last: NSR, normal axis and intervals, no ST segment abnormalities    09/11/19 CARDIAC PROCEDURE 09/11/2019 9/11/2019    Narrative · Non-STEMI: Culprit lesion is distal right posterior lateral branch. · Distal RPL branches left alone for medical therapy. It is too small to   percutaneously intervene her bypass. · Normal LV function. Signed byMarino Brown MD     09/11/19   ECHO ADULT COMPLETE 09/12/2019 9/12/2019    Narrative · Left Ventricle: Normal cavity size, systolic function (ejection fraction   normal) and diastolic function. Mild concentric hypertrophy. Estimated   left ventricular ejection fraction is 56 - 60%. Visually measured ejection   fraction. No regional wall motion abnormality noted. · Tricuspid Valve: Mild tricuspid valve regurgitation is present. · Left Atrium: Mildly dilated left atrium. · Pulmonary Artery: Pulmonary arterial systolic pressure is 25 mmHg. · Mitral Valve: Mild mitral valve regurgitation is present.         Signed by: Chandler Wolfe MD     Lab Results   Component Value Date/Time    TSH 2.98 09/13/2019 05:03 AM     Lab Results   Component Value Date/Time    Cholesterol, total 216 (H) 09/11/2019 10:21 AM    HDL Cholesterol 63 (H) 09/11/2019 10:21 AM    LDL, calculated 137.8 (H) 09/11/2019 10:21 AM    VLDL, calculated 15.2 09/11/2019 10:21 AM    Triglyceride 76 09/11/2019 10:21 AM    CHOL/HDL Ratio 3.4 09/11/2019 10:21 AM         Impression and Plan:  Butch Pickering is a 45 y.o. with:    1.) S/p NSTEMI 9/11/19, possible nonathero etiology/ SCAD, small RPDA, no PCI  2.) Normal LV function  3.) Recurrent CP since #1  4.) HTN  5.) Dyslipidemia  6.) One episode pAFib, known, ChadsVasc ~1  7.) Intolerance to SL NTG, causes H/As, known    1.) Would do DAPT for at least 1 month post NSTEMI- Rx for Plavix given  2.) Agree with medical therapy- incl BP and statin  3.) Refer to cardiac rehab: Indication NSTEMI  4.) No additional anticoag needed for AFib- ASA 81 alone for now sufficient  5.) RTC 3 months, please call us sooner if recurrent CP (will increase BB)    Thank you for allowing me to participate in the care of your patient, please do not hesitate to call with questions or concerns. Follow-up and Dispositions    · Return in about 3 months (around 1/9/2020).      [address] [place] [address],

## 2019-10-10 ENCOUNTER — OFFICE VISIT (OUTPATIENT)
Dept: FAMILY MEDICINE CLINIC | Facility: CLINIC | Age: 44
End: 2019-10-10

## 2019-10-10 VITALS
OXYGEN SATURATION: 98 % | TEMPERATURE: 98 F | WEIGHT: 176 LBS | RESPIRATION RATE: 18 BRPM | HEIGHT: 60 IN | BODY MASS INDEX: 34.55 KG/M2 | SYSTOLIC BLOOD PRESSURE: 134 MMHG | DIASTOLIC BLOOD PRESSURE: 82 MMHG | HEART RATE: 79 BPM

## 2019-10-10 DIAGNOSIS — I10 HYPERTENSION, UNSPECIFIED TYPE: Primary | ICD-10-CM

## 2019-10-10 DIAGNOSIS — E78.5 HYPERLIPIDEMIA, UNSPECIFIED HYPERLIPIDEMIA TYPE: ICD-10-CM

## 2019-10-10 DIAGNOSIS — Z12.39 BREAST CANCER SCREENING: ICD-10-CM

## 2019-10-10 NOTE — PROGRESS NOTES
Amber Martinez is a 45 y.o. female presenting today for New Patient (establish care)  . HPI:  Amber Martinez presents to the office today to establish care with the practice. The patient has a PMH for hypertension and hyperlipidemia. Her blood prssue is controlled and denies chest pain, palpation or dyspnea. She was seen in the ED in September, 2019 for chest pain and per the notes she had been noncompliant with taking her medications. She is now compliant with taking her medication daily. Review of Systems   Respiratory: Negative for cough and sputum production. Cardiovascular: Negative for chest pain and palpitations. Neurological: Negative for dizziness and headaches. No Known Allergies    Current Outpatient Medications   Medication Sig Dispense Refill    atorvastatin (LIPITOR) 40 mg tablet Take 1 Tab by mouth nightly. 90 Tab 3    carvedilol (COREG) 12.5 mg tablet Take 1 Tab by mouth two (2) times daily (with meals). 180 Tab 3    amLODIPine (NORVASC) 5 mg tablet Take 1 Tab by mouth daily. 90 Tab 3    clopidogrel (PLAVIX) 75 mg tab Take 1 Tab by mouth daily. For 1 month then resume aspirin 30 Tab 0    aspirin 81 mg chewable tablet Take 1 Tab by mouth daily. 30 Tab 0    docusate sodium (COLACE) 100 mg capsule Take 1 Cap by mouth two (2) times a day. 60 Cap 0    nitroglycerin (NITROSTAT) 0.4 mg SL tablet 1 Tab by SubLINGual route as needed for Chest Pain. Up to 3 doses. 20 Tab 0    OTHER Check CBC, CMP, Mg in 5 days, results to PCP immediately, Diagnosis- CAD 1 Each 0    OTHER This is to certify that Adalid Verduzco was admitted to [facility] on 09/11/19 and discharged on 09/13/19 , and has been advised to take rest at home for 14 more days and then resume work if symptom free.  1 Each 0       Past Medical History:   Diagnosis Date    HLD (hyperlipidemia)     Hypertension        Past Surgical History:   Procedure Laterality Date    HX HYSTERECTOMY         Social History     Socioeconomic History    Marital status: SINGLE     Spouse name: Not on file    Number of children: Not on file    Years of education: Not on file    Highest education level: Not on file   Occupational History    Not on file   Social Needs    Financial resource strain: Not on file    Food insecurity:     Worry: Not on file     Inability: Not on file    Transportation needs:     Medical: Not on file     Non-medical: Not on file   Tobacco Use    Smoking status: Never Smoker    Smokeless tobacco: Never Used   Substance and Sexual Activity    Alcohol use: No    Drug use: No    Sexual activity: Not on file   Lifestyle    Physical activity:     Days per week: Not on file     Minutes per session: Not on file    Stress: Not on file   Relationships    Social connections:     Talks on phone: Not on file     Gets together: Not on file     Attends Restorationism service: Not on file     Active member of club or organization: Not on file     Attends meetings of clubs or organizations: Not on file     Relationship status: Not on file    Intimate partner violence:     Fear of current or ex partner: Not on file     Emotionally abused: Not on file     Physically abused: Not on file     Forced sexual activity: Not on file   Other Topics Concern    Not on file   Social History Narrative    Not on file       Patient does not have an advanced directive on file    Vitals:    10/10/19 1527 10/10/19 1641   BP: (!) 183/102 134/82   Pulse: 79    Resp: 18    Temp: 98 °F (36.7 °C)    TempSrc: Oral    SpO2: 98%    Weight: 176 lb (79.8 kg)    Height: 5' (1.524 m)    PainSc:   0 - No pain        Physical Exam   Constitutional: She is oriented to person, place, and time. No distress. Cardiovascular: Normal rate, regular rhythm and normal heart sounds. Pulmonary/Chest: Effort normal and breath sounds normal.   Neurological: She is alert and oriented to person, place, and time.    Nursing note and vitals reviewed. Admission on 09/11/2019, Discharged on 09/13/2019   Component Date Value Ref Range Status    WBC 09/11/2019 8.9  4.6 - 13.2 K/uL Final    RBC 09/11/2019 4.68  4.20 - 5.30 M/uL Final    HGB 09/11/2019 14.0  12.0 - 16.0 g/dL Final    HCT 09/11/2019 41.7  35.0 - 45.0 % Final    MCV 09/11/2019 89.1  74.0 - 97.0 FL Final    MCH 09/11/2019 29.9  24.0 - 34.0 PG Final    MCHC 09/11/2019 33.6  31.0 - 37.0 g/dL Final    RDW 09/11/2019 13.0  11.6 - 14.5 % Final    PLATELET 52/23/3202 010  135 - 420 K/uL Final    MPV 09/11/2019 10.2  9.2 - 11.8 FL Final    NEUTROPHILS 09/11/2019 66  40 - 73 % Final    LYMPHOCYTES 09/11/2019 26  21 - 52 % Final    MONOCYTES 09/11/2019 6  3 - 10 % Final    EOSINOPHILS 09/11/2019 2  0 - 5 % Final    BASOPHILS 09/11/2019 0  0 - 2 % Final    ABS. NEUTROPHILS 09/11/2019 5.8  1.8 - 8.0 K/UL Final    ABS. LYMPHOCYTES 09/11/2019 2.3  0.9 - 3.6 K/UL Final    ABS. MONOCYTES 09/11/2019 0.5  0.05 - 1.2 K/UL Final    ABS. EOSINOPHILS 09/11/2019 0.2  0.0 - 0.4 K/UL Final    ABS. BASOPHILS 09/11/2019 0.0  0.0 - 0.1 K/UL Final    DF 09/11/2019 AUTOMATED    Final    Sodium 09/11/2019 143  136 - 145 mmol/L Final    Potassium 09/11/2019 3.3* 3.5 - 5.5 mmol/L Final    Chloride 09/11/2019 107  100 - 111 mmol/L Final    CO2 09/11/2019 28  21 - 32 mmol/L Final    Anion gap 09/11/2019 8  3.0 - 18 mmol/L Final    Glucose 09/11/2019 113* 74 - 99 mg/dL Final    BUN 09/11/2019 14  7.0 - 18 MG/DL Final    Creatinine 09/11/2019 0.75  0.6 - 1.3 MG/DL Final    BUN/Creatinine ratio 09/11/2019 19  12 - 20   Final    GFR est AA 09/11/2019 >60  >60 ml/min/1.73m2 Final    GFR est non-AA 09/11/2019 >60  >60 ml/min/1.73m2 Final    Comment: (NOTE)  Estimated GFR is calculated using the Modification of Diet in Renal   Disease (MDRD) Study equation, reported for both  Americans   (GFRAA) and non- Americans (GFRNA), and normalized to 1.73m2   body surface area.  The physician must decide which value applies to   the patient. The MDRD study equation should only be used in   individuals age 25 or older. It has not been validated for the   following: pregnant women, patients with serious comorbid conditions,   or on certain medications, or persons with extremes of body size,   muscle mass, or nutritional status.  Calcium 09/11/2019 8.8  8.5 - 10.1 MG/DL Final    CK 09/11/2019 183  26 - 192 U/L Final    CK - MB 09/11/2019 9.5* <3.6 ng/ml Final    CK-MB Index 09/11/2019 5.2* 0.0 - 4.0 % Final    Troponin-I, QT 09/11/2019 2.74* 0.0 - 0.045 NG/ML Final    Comment: ED Critical Value  The presence of detectable troponin above the reference range indicates myocardial injury which may be due to ischemia, myocarditis, trauma, etc.  Clinical correlation is necessary to establish the significance of this finding. Sequential testing is recommended to determine if the typical rise and fall of cTnI is demonstrated. Note:  Cardiac troponin I has a relatively long half life and may be present well after the CK MB has returned to baseline. The reference range is based on the 99th percentile of the referent population.   CALLED TO AND CORRECTLY REPEATED BY:  Zia Acuna RN IN THE ER TO [facility] ON [id number] AT 0307      Ventricular Rate 09/11/2019 70  BPM Final    Atrial Rate 09/11/2019 70  BPM Final    P-R Interval 09/11/2019 162  ms Final    QRS Duration 09/11/2019 92  ms Final    Q-T Interval 09/11/2019 392  ms Final    QTC Calculation (Bezet) 09/11/2019 423  ms Final    Calculated P Axis 09/11/2019 54  degrees Final    Calculated R Axis 09/11/2019 0  degrees Final    Calculated T Axis 09/11/2019 26  degrees Final    Diagnosis 09/11/2019    Final                    Value:Normal sinus rhythm  Minimal voltage criteria for LVH, may be normal variant  Nonspecific T wave abnormality  Abnormal ECG  When compared with ECG of 11-OCT-2018 23:01,  Nonspecific T wave abnormality now evident in Lateral leads  Confirmed by Daniel Starks ([id number]) on 9/12/2019 7:35:39 AM      Prothrombin time 09/11/2019 12.0  11.5 - 15.2 sec Final    INR 09/11/2019 0.9  0.8 - 1.2   Final    Comment:            INR Therapeutic Ranges         (on stable oral anticoagulant):     INDICATION                INR  DVT/PE/Atrial Fib          2.0-3.0  MI/Mechanical Heart Valve  2.5-3.5      aPTT 09/11/2019 28.5  23.0 - 36.4 SEC Final    HCG urine, QL 09/11/2019 NEGATIVE   NEG   Final    Test results should be confirmed using serum quantitative hCG when detection of pregnancy is critical and before performing any critical medical procedure.  CK 09/11/2019 579* 26 - 192 U/L Final    CK - MB 09/11/2019 66.3* <3.6 ng/ml Final    CK-MB Index 09/11/2019 11.5* 0.0 - 4.0 % Final    Troponin-I, QT 09/11/2019 14.40* 0.0 - 0.045 NG/ML Final    Comment: The presence of detectable troponin above the reference range indicates myocardial injury which may be due to ischemia, myocarditis, trauma, etc.  Clinical correlation is necessary to establish the significance of this finding. Sequential testing is recommended to determine if the typical rise and fall of cTnI is demonstrated. Note:  Cardiac troponin I has a relatively long half life and may be present well after the CK MB has returned to baseline. The reference range is based on the 99th percentile of the referent population. CALLED TO AND CORRECTLY REPEATED BY:   KAVEH BHATTI, 1125, 2S, 09/11/2019, EBONIE      LIPID PROFILE 09/11/2019        Final    Cholesterol, total 09/11/2019 216* <200 MG/DL Final    Triglyceride 09/11/2019 76  <150 MG/DL Final    Comment: The drugs N-acetylcysteine (NAC) and  Metamiszole have been found to cause falsely  low results in this chemical assay. Please  be sure to submit blood samples obtained  BEFORE administration of either of these  drugs to assure correct results.       HDL Cholesterol 09/11/2019 63* 40 - 60 MG/DL Final    LDL, calculated 09/11/2019 137.8* 0 - 100 MG/DL Final    VLDL, calculated 09/11/2019 15.2  MG/DL Final    CHOL/HDL Ratio 09/11/2019 3.4  0 - 5.0   Final    Hemoglobin A1c 09/11/2019 5.6  4.2 - 5.6 % Final    Comment: (NOTE)  HbA1C Interpretive Ranges  <5.7              Normal  5.7 - 6.4         Consider Prediabetes  >6.5              Consider Diabetes      LA Volume 09/12/2019 62.64  22 - 52 mL Final    Ao Root D 09/12/2019 2.53  cm Final    LVIDd 09/12/2019 4.20  3.9 - 5.3 cm Final    LVPWd 09/12/2019 1.17* 0.6 - 0.9 cm Final    LVIDs 09/12/2019 2.46  cm Final    IVSd 09/12/2019 1.36* 0.6 - 0.9 cm Final    LVOT d 09/12/2019 1.76  cm Final    LVOT Peak Velocity 09/12/2019 82.97  cm/s Final    LVOT Peak Gradient 09/12/2019 2.8  mmHg Final    LVOT VTI 09/12/2019 18.06  cm Final    MV A Attila 09/12/2019 78.58  cm/s Final    MV E Attila 09/12/2019 65.75  cm/s Final    MV E/A 09/12/2019 0.84   Final    LA Vol 4C 09/12/2019 58.17* 22 - 52 mL Final    LA Vol 2C 09/12/2019 60.31* 22 - 52 mL Final    LA Area 4C 09/12/2019 19.7  cm2 Final    LV Mass AL 09/12/2019 227.1* 67 - 162 g Final    LV Mass AL Index 09/12/2019 130.7* 43 - 95 g/m2 Final    Mitral Valve E Wave Deceleration T* 09/12/2019 194.1  ms Final    Triscuspid Valve Regurgitation Pea* 09/12/2019 21.8  mmHg Final    TR Max Velocity 09/12/2019 233.45  cm/s Final    LA Vol Index 09/12/2019 36.05  16 - 28 ml/m2 Final    LA Vol Index 09/12/2019 34.71  16 - 28 ml/m2 Final    LA Vol Index 09/12/2019 33.48  16 - 28 ml/m2 Final    CK 09/11/2019 487* 26 - 192 U/L Final    CK - MB 09/11/2019 49.5* <3.6 ng/ml Final    CK-MB Index 09/11/2019 10.2* 0.0 - 4.0 % Final    Troponin-I, QT 09/11/2019 12.80* 0.0 - 0.045 NG/ML Final    Comment: The presence of detectable troponin above the reference range indicates myocardial injury which may be due to ischemia, myocarditis, trauma, etc.  Clinical correlation is necessary to establish the significance of this finding.   Sequential testing is recommended to determine if the typical rise and fall of cTnI is demonstrated. Note:  Cardiac troponin I has a relatively long half life and may be present well after the CK MB has returned to baseline. The reference range is based on the 99th percentile of the referent population. Critical value verified. Called to and read back by:  KIM [id number] APARNA([id number]) STUART CHERRY RN      Magnesium 09/11/2019 2.0  1.6 - 2.6 mg/dL Final    Ventricular Rate 09/11/2019 136  BPM Final    Atrial Rate 09/11/2019 105  BPM Final    QRS Duration 09/11/2019 80  ms Final    Q-T Interval 09/11/2019 320  ms Final    QTC Calculation (Bezet) 09/11/2019 481  ms Final    Calculated R Axis 09/11/2019 28  degrees Final    Calculated T Axis 09/11/2019 -116  degrees Final    Diagnosis 09/11/2019    Final                    Value:Atrial fibrillation with rapid ventricular response  ST & T wave abnormality, consider inferolateral ischemia or digitalis effect  Abnormal ECG  When compared with ECG of 11-Sep-2019 02:31:05  Atrial fibrillation with rapid ventricular response has replaced Sinus rhythm  Confirmed by Carlos Dalal ([id number]) on 9/12/2019 8:09:09 AM      CK 09/11/2019 374* 26 - 192 U/L Final    CK - MB 09/11/2019 30.7* <3.6 ng/ml Final    CK-MB Index 09/11/2019 8.2* 0.0 - 4.0 % Final    Troponin-I, QT 09/11/2019 7.93* 0.0 - 0.045 NG/ML Final    Comment: The presence of detectable troponin above the reference range indicates myocardial injury which may be due to ischemia, myocarditis, trauma, etc.  Clinical correlation is necessary to establish the significance of this finding. Sequential testing is recommended to determine if the typical rise and fall of cTnI is demonstrated. Note:  Cardiac troponin I has a relatively long half life and may be present well after the CK MB has returned to baseline. The reference range is based on the 99th percentile of the referent population.   CALLED TO AND CORRECTLY REPEATED BY: KAVEH ALVARENGA, 2SOUTH AT 2310 ON [id number], BY ELD      WBC 09/12/2019 10.6  4.6 - 13.2 K/uL Final    RBC 09/12/2019 4.46  4.20 - 5.30 M/uL Final    HGB 09/12/2019 13.2  12.0 - 16.0 g/dL Final    HCT 09/12/2019 38.7  35.0 - 45.0 % Final    MCV 09/12/2019 86.8  74.0 - 97.0 FL Final    MCH 09/12/2019 29.6  24.0 - 34.0 PG Final    MCHC 09/12/2019 34.1  31.0 - 37.0 g/dL Final    RDW 09/12/2019 13.1  11.6 - 14.5 % Final    PLATELET 05/37/2471 327  135 - 420 K/uL Final    MPV 09/12/2019 10.4  9.2 - 11.8 FL Final    NEUTROPHILS 09/12/2019 68  40 - 73 % Final    LYMPHOCYTES 09/12/2019 23  21 - 52 % Final    MONOCYTES 09/12/2019 8  3 - 10 % Final    EOSINOPHILS 09/12/2019 1  0 - 5 % Final    BASOPHILS 09/12/2019 0  0 - 2 % Final    ABS. NEUTROPHILS 09/12/2019 7.2  1.8 - 8.0 K/UL Final    ABS. LYMPHOCYTES 09/12/2019 2.4  0.9 - 3.6 K/UL Final    ABS. MONOCYTES 09/12/2019 0.9  0.05 - 1.2 K/UL Final    ABS. EOSINOPHILS 09/12/2019 0.1  0.0 - 0.4 K/UL Final    ABS.  BASOPHILS 09/12/2019 0.0  0.0 - 0.1 K/UL Final    DF 09/12/2019 AUTOMATED    Final    Sodium 09/12/2019 137  136 - 145 mmol/L Final    Potassium 09/12/2019 2.9* 3.5 - 5.5 mmol/L Final    Comment: CALLED TO AND CORRECTLY REPEATED BY:  KAVEH ERICKSON, 2SOUTH AT 0405 ON [id number], BY ELD      Chloride 09/12/2019 102  100 - 111 mmol/L Final    CO2 09/12/2019 27  21 - 32 mmol/L Final    Anion gap 09/12/2019 8  3.0 - 18 mmol/L Final    Glucose 09/12/2019 84  74 - 99 mg/dL Final    BUN 09/12/2019 12  7.0 - 18 MG/DL Final    Creatinine 09/12/2019 0.79  0.6 - 1.3 MG/DL Final    BUN/Creatinine ratio 09/12/2019 15  12 - 20   Final    GFR est AA 09/12/2019 >60  >60 ml/min/1.73m2 Final    GFR est non-AA 09/12/2019 >60  >60 ml/min/1.73m2 Final    Calcium 09/12/2019 8.1* 8.5 - 10.1 MG/DL Final    Magnesium 09/12/2019 2.5  1.6 - 2.6 mg/dL Final    Ventricular Rate 09/12/2019 66  BPM Final    Atrial Rate 09/12/2019 66  BPM Final    P-R Interval 09/12/2019 170  ms Final    QRS Duration 09/12/2019 84  ms Final    Q-T Interval 09/12/2019 460  ms Final    QTC Calculation (Bezet) 09/12/2019 482  ms Final    Calculated P Axis 09/12/2019 57  degrees Final    Calculated T Axis 09/12/2019 -60  degrees Final    Diagnosis 09/12/2019    Final                    Value:Normal sinus rhythm  Minimal voltage criteria for LVH, may be normal variant  T wave abnormality, consider inferolateral ischemia  Prolonged QT  Abnormal ECG  When compared with ECG of 11-SEP-2019 14:47,  Significant changes have occurred  Confirmed by Anjana Alvares ([id number]) on 9/12/2019 4:12:51 PM      TSH 09/13/2019 2.98  0.36 - 3.74 uIU/mL Final    T4, Free 09/13/2019 0.8  0.7 - 1.5 NG/DL Final    Thyroid peroxidase Ab 09/13/2019 101* 0 - 34 IU/mL Final    Comment: (NOTE)  Performed At: [address] [id number]  Laverne Dumont MD [id number]      Thyroglobulin Ab 09/13/2019 <1.0  0.0 - 0.9 IU/mL Final    Comment: (NOTE)  Thyroglobulin Antibody measured by [facility]  Performed At: [address] [id number]  Laverne Dumont MD [id number]      Prolactin 09/13/2019 16.4  ng/mL Final    Comment: Non pregnant      2.8-29.2    ng/mL  Pregnant          9.7-208.5   ng/mL  Postmenopausal    1.8-20.3    ng/mL      [facility] 09/13/2019 9.1  mIU/mL Final    Comment: (NOTE)  FEMALES:   0-12 YR:  No reference range established   Pregnant: No reference range established  Non Pregnant:   Follicular Phase: 6.0-26.0  Mid-cycle peak: 5.2-17.5  Luteal Phase: 1.7-9.5  Post-menopausal on Menopausal Hormone Therapy (MHT): 5.9-72.8  Post-menopausal not on MHT: 12.7-132.2    MALES:  0-12 YR:   No reference range established  >12 YR:   0.7-10.8        Luteinizing hormone 09/13/2019 5.2  mIU/mL Final    Comment: (NOTE)  FEMALES:  0-12 YR: No reference range established  Pregnant: No reference range established  Non Pregnant:  Follicular Phase: 7.6-36.5  Mid-cycle peak: 22.8-76.1  Luteal Phase: 0.6-13.5  Post-menopausal on Menopausal Hormone Therapy (MHT): 1.1-52.4  Post-menopausal Not on MHT: 8.6-61.8    MALES:  0-12 YR: No reference range established  >12 YR:  1.2-10.6      Insulin-Like Growth Factor I 09/13/2019 160  62 - 204 ng/mL Final    Comment: (NOTE)  Performed At: [address] [id number]  Jonas Low MD [id number]      Sodium 09/13/2019 144  136 - 145 mmol/L Final    Potassium 09/13/2019 3.8  3.5 - 5.5 mmol/L Final    INVESTIGATED PER DELTA CHECK PROTOCOL    Chloride 09/13/2019 111  100 - 111 mmol/L Final    CO2 09/13/2019 24  21 - 32 mmol/L Final    Anion gap 09/13/2019 9  3.0 - 18 mmol/L Final    Glucose 09/13/2019 137* 74 - 99 mg/dL Final    BUN 09/13/2019 13  7.0 - 18 MG/DL Final    Creatinine 09/13/2019 0.86  0.6 - 1.3 MG/DL Final    BUN/Creatinine ratio 09/13/2019 15  12 - 20   Final    GFR est AA 09/13/2019 >60  >60 ml/min/1.73m2 Final    GFR est non-AA 09/13/2019 >60  >60 ml/min/1.73m2 Final    Calcium 09/13/2019 8.3* 8.5 - 10.1 MG/DL Final       .No results found for any visits on 10/10/19. Assessment / Plan:      ICD-10-CM ICD-9-CM    1. Hypertension, unspecified type I10 401.9    2. Breast cancer screening Z12.39 V76.10 [facility] 3D YINA W MAMMO BI SCREENING INCL CAD      CANCELED: [facility] MAMMO RT DX INCL CAD   3. Hyperlipidemia, unspecified hyperlipidemia type E78.5 272.4      HTN- controlled  Mammo ordered  HLD- continue current treatment plan  F/u in 6 weeks      Follow-up and Dispositions    · Return if symptoms worsen or fail to improve. I asked the patient if she  had any questions and answered her  questions. The patient stated that she understands the treatment plan and agrees with the treatment plan    This document was created with a voice activated dictation system and may contain transcription errors.

## 2019-10-10 NOTE — PROGRESS NOTES
Visit Vitals  BP (!) 183/102   Pulse 79   Temp 98 °F (36.7 °C) (Oral)   Resp 18   Ht 5' (1.524 m)   Wt 176 lb (79.8 kg)   SpO2 98%   BMI 34.37 kg/m²     Erica Dawson presents today for   Chief Complaint   Patient presents with    New Patient     establish care       Is someone accompanying this pt? no    Is the patient using any DME equipment during OV? no    Depression Screening:  3 most recent PHQ Screens 10/10/2019   Little interest or pleasure in doing things Not at all   Feeling down, depressed, irritable, or hopeless Not at all   Total Score PHQ 2 0       Learning Assessment:  Learning Assessment 10/10/2019   PRIMARY LEARNER Patient   HIGHEST LEVEL OF EDUCATION - PRIMARY LEARNER  2 YEARS Kya PRIMARY LEARNER NONE   CO-LEARNER CAREGIVER No   PRIMARY LANGUAGE ENGLISH   LEARNER PREFERENCE PRIMARY DEMONSTRATION   ANSWERED BY patient   RELATIONSHIP SELF       Abuse Screening:  Abuse Screening Questionnaire 10/10/2019   Do you ever feel afraid of your partner? N   Are you in a relationship with someone who physically or mentally threatens you? N   Is it safe for you to go home? Y       Fall Risk  No flowsheet data found. Health Maintenance reviewed and discussed and ordered per Provider. Health Maintenance Due   Topic Date Due    DTaP/Tdap/Td series (1 - Tdap) 07/07/1998    PAP AKA CERVICAL CYTOLOGY  07/07/1998    Influenza Age 5 to Adult  08/01/2019           Coordination of Care:  1. Have you been to the ER, urgent care clinic since your last visit? Hospitalized since your last visit? yes    2. Have you seen or consulted any other health care providers outside of the [address] [place] [address] since your last visit? Include any pap smears or colon screening.  no

## 2019-10-16 ENCOUNTER — TELEPHONE (OUTPATIENT)
Dept: CARDIOLOGY CLINIC | Age: 44
End: 2019-10-16

## 2019-10-16 NOTE — TELEPHONE ENCOUNTER
----- Message from Tereza Gonzales DO sent at 10/10/2019  5:01 PM EDT -----  Regarding: please call pt  Please call pt and let her know I reviewed her hospital records thoroughly and she is correct- she had an episode of atrial fibrillation when in the hospital    This is common in the setting of a heart attack  Nothing different to do at this time and we can discuss it further at next visit

## 2019-10-17 ENCOUNTER — TELEPHONE (OUTPATIENT)
Dept: FAMILY MEDICINE CLINIC | Facility: CLINIC | Age: 44
End: 2019-10-17

## 2019-10-17 NOTE — TELEPHONE ENCOUNTER
Orders have been sent to [facility] for diagnostic mammogram and ultrasound. Orders need to be signed. Patient is scheduled Friday, 10/18.

## 2019-10-18 ENCOUNTER — HOSPITAL ENCOUNTER (OUTPATIENT)
Dept: ULTRASOUND IMAGING | Age: 44
Discharge: HOME | End: 2019-10-18
Attending: NURSE PRACTITIONER
Payer: COMMERCIAL

## 2019-10-18 ENCOUNTER — HOSPITAL ENCOUNTER (OUTPATIENT)
Dept: MAMMOGRAPHY | Age: 44
Discharge: HOME | End: 2019-10-18
Attending: NURSE PRACTITIONER
Payer: COMMERCIAL

## 2019-10-18 DIAGNOSIS — Z12.31 ENCOUNTER FOR SCREENING MAMMOGRAM FOR BREAST CANCER: ICD-10-CM

## 2019-10-18 DIAGNOSIS — N63.0 BREAST MASS: ICD-10-CM

## 2019-10-18 PROCEDURE — 76642 ULTRASOUND BREAST LIMITED: CPT

## 2019-10-18 PROCEDURE — 77062 BREAST TOMOSYNTHESIS BI: CPT

## 2019-10-22 DIAGNOSIS — N64.59 ABNORMAL BREAST FINDING: Primary | ICD-10-CM

## 2019-11-06 RX ORDER — CLOPIDOGREL BISULFATE 75 MG/1
75 TABLET ORAL DAILY
Qty: 30 TAB | Refills: 0 | Status: SHIPPED | OUTPATIENT
Start: 2019-11-06

## 2020-10-28 RX ORDER — AMLODIPINE BESYLATE 5 MG/1
TABLET ORAL
Qty: 90 TAB | Refills: 1 | Status: SHIPPED | OUTPATIENT
Start: 2020-10-28

## 2020-10-28 RX ORDER — CARVEDILOL 12.5 MG/1
TABLET ORAL
Qty: 180 TAB | Refills: 1 | Status: SHIPPED | OUTPATIENT
Start: 2020-10-28

## 2023-09-28 NOTE — ED NOTES
Pt inf by my chart, work note updated and at  for     Pt signed hard copy of discharged instructions.

## (undated) DEVICE — ANGIOGRAPHY KIT CUST VASC

## (undated) DEVICE — BAND HEMOSTAT DRY D-STAT RAD --

## (undated) DEVICE — COVER US PRB W15XL120CM W/ GEL RUBBERBAND TAPE STRP FLD GEN

## (undated) DEVICE — PROCEDURE KIT FLUID MGMT 10 FR CUST MAINFOLD

## (undated) DEVICE — PRESSURE MONITORING SET: Brand: TRUWAVE

## (undated) DEVICE — PACK PROCEDURE SURG VASC CATH 161 MMC LF

## (undated) DEVICE — RADIFOCUS OPTITORQUE ANGIOGRAPHIC CATHETER: Brand: OPTITORQUE

## (undated) DEVICE — SET FLD ADMIN 3 W STPCOCK FIX FEM L BOR 1IN

## (undated) DEVICE — GLIDESHEATH SLENDER STAINLESS STEEL KIT: Brand: GLIDESHEATH SLENDER